# Patient Record
Sex: FEMALE | Race: WHITE | Employment: PART TIME | URBAN - METROPOLITAN AREA
[De-identification: names, ages, dates, MRNs, and addresses within clinical notes are randomized per-mention and may not be internally consistent; named-entity substitution may affect disease eponyms.]

---

## 2020-01-15 ENCOUNTER — OFFICE VISIT (OUTPATIENT)
Dept: INTERNAL MEDICINE CLINIC | Facility: CLINIC | Age: 70
End: 2020-01-15

## 2020-01-15 VITALS
DIASTOLIC BLOOD PRESSURE: 76 MMHG | HEART RATE: 99 BPM | RESPIRATION RATE: 14 BRPM | SYSTOLIC BLOOD PRESSURE: 152 MMHG | HEIGHT: 63 IN | WEIGHT: 149.2 LBS | OXYGEN SATURATION: 97 % | BODY MASS INDEX: 26.44 KG/M2 | TEMPERATURE: 98.2 F

## 2020-01-15 DIAGNOSIS — R73.01 IFG (IMPAIRED FASTING GLUCOSE): ICD-10-CM

## 2020-01-15 DIAGNOSIS — I10 ESSENTIAL HYPERTENSION: Primary | ICD-10-CM

## 2020-01-15 DIAGNOSIS — F41.9 ANXIETY: ICD-10-CM

## 2020-01-15 DIAGNOSIS — Z12.31 ENCOUNTER FOR SCREENING MAMMOGRAM FOR BREAST CANCER: ICD-10-CM

## 2020-01-15 DIAGNOSIS — Z11.59 ENCOUNTER FOR HEPATITIS C SCREENING TEST FOR LOW RISK PATIENT: ICD-10-CM

## 2020-01-15 DIAGNOSIS — E28.39 ESTROGEN DEFICIENCY: ICD-10-CM

## 2020-01-15 DIAGNOSIS — N95.1 MENOPAUSAL SYMPTOMS: ICD-10-CM

## 2020-01-15 RX ORDER — MULTIVITAMIN
TABLET ORAL
COMMUNITY

## 2020-01-15 RX ORDER — LORAZEPAM 0.5 MG/1
0.5 TABLET ORAL
Qty: 20 TAB | Refills: 0 | Status: CANCELLED | OUTPATIENT
Start: 2020-01-15

## 2020-01-15 RX ORDER — PAROXETINE 7.5 MG/1
7.5 CAPSULE ORAL DAILY
Qty: 30 CAP | Refills: 1 | Status: SHIPPED | OUTPATIENT
Start: 2020-01-15 | End: 2020-02-26

## 2020-01-15 RX ORDER — BISMUTH SUBSALICYLATE 262 MG
1 TABLET,CHEWABLE ORAL DAILY
COMMUNITY

## 2020-01-15 RX ORDER — HYDROCHLOROTHIAZIDE 25 MG/1
25 TABLET ORAL DAILY
COMMUNITY
End: 2020-06-29

## 2020-01-15 RX ORDER — BUSPIRONE HYDROCHLORIDE 5 MG/1
TABLET ORAL
COMMUNITY
End: 2020-06-26 | Stop reason: SDUPTHER

## 2020-01-15 RX ORDER — LOSARTAN POTASSIUM 100 MG/1
TABLET ORAL
COMMUNITY
Start: 2019-12-18 | End: 2020-06-29

## 2020-01-15 RX ORDER — LANOLIN ALCOHOL/MO/W.PET/CERES
1000 CREAM (GRAM) TOPICAL DAILY
COMMUNITY

## 2020-01-15 RX ORDER — ESTERIFIED ESTROGEN AND METHYLTESTOSTERONE .625; 1.25 MG/1; MG/1
TABLET ORAL
COMMUNITY
End: 2020-01-15

## 2020-01-15 NOTE — PROGRESS NOTES
HPI  Ms. Sherry Warren is a 71y.o. year old female, she is seen today to establish care. Had hysterectomy in early 45s and was on premarin for years until about 3 years ago stopped premarin and since then hot flashes, feeling irritable, feeling on edge all the time. Feeling anxious frequently. No known stressors, working part time as  and enjoys her job. Gets stressed out quickly as well. Doesn't feel she is getting enough sleep. Normally sleeps only 5-6 hours per night prior to work days, difficulty falling asleep. On days she doesn't have to work may sleep longer. Will feel sad and not know why at times, no crying spells. No SI. Doesn't like getting old. Has been thinking more about getting older. Drinks 2 cups of coffee in the morning, water for the rest of the day. Will attempt to quit smoking in near future. No chest pain, sob, dizziness, weakness, n/v/abd pain. No melena or brbpr. No cough or wheezing. Has occasional HA, takes tylenol and it resolves. Hasn't been checking bp lately. Higher in the office but better at home. No edema, dizziness, lightheadedness. Chief Complaint   Patient presents with   [facility] Establish Care     Room 2B// Previous PCP- Dr Bradley Escobar - last seen this month // fasting // colonoscopy due next year // mammo, dexa due     Menopause     stopped hormone replacement x \"couple years\"    Nicotine Dependence     has rx for Chantix to start next month         Prior to Admission medications    Medication Sig Start Date End Date Taking? Authorizing Provider   busPIRone (BUSPAR) 5 mg tablet Take  by mouth. Yes Provider, Historical   losartan (COZAAR) 100 mg tablet TAKE 1 TABLET BY MOUTH ONCE DAILY FOR 90 DAYS 12/18/19  Yes Provider, Historical   hydroCHLOROthiazide (HYDRODIURIL) 25 mg tablet Take 25 mg by mouth daily. Yes Provider, Historical   multivitamin (ONE A DAY) tablet Take 1 Tab by mouth daily.    Yes Provider, Historical   docosahexanoic acid/epa (FISH OIL PO) Take  by mouth. Yes Provider, Historical   B.infantis-B.ani-B.long-B.bifi (PROBIOTIC 4X) 10-15 mg TbEC Take  by mouth. Yes Provider, Historical   vitamin E acetate (VITAMIN E PO) Take  by mouth. Yes Provider, Historical   cinnamon bark (CINNAMON) 500 mg cap Take  by mouth. Yes Provider, Historical   cyanocobalamin 1,000 mcg tablet Take 1,000 mcg by mouth daily. Yes Provider, Historical   ubidecarenone (CO Q-10 PO) Take  by mouth. Yes Provider, Historical   PARoxetine mesylate,menop.sym, (BRISDELLE) 7.5 mg cap Take 7.5 mg by mouth daily. 1/15/20  Yes Shirlene Sharpe MD   aspirin delayed-release 81 mg tablet Take  by mouth daily. Yes Provider, Historical   estrogens, conjugated,-methylTESTOSTERone (ESTRATEST HS) 0.625-1.25 mg per tablet esterified estrogens-methyltestosterone 0.625 mg-1.25 mg tablet  1/15/20  Provider, Historical   LORazepam (ATIVAN) 0.5 mg tablet Take 1 tablet by mouth every eight (8) hours as needed for Anxiety. 11/23/14   Sim Cam MD   losartan-hydrochlorothiazide [facility]) 100-25 mg per tablet Take 1 tablet by mouth daily. 1/15/20  Brian Grimes MD   omeprazole (PRILOSEC) 20 mg capsule Take 20 mg by mouth daily. 1/15/20  Brian Grimes MD   citalopram (CELEXA) 20 mg tablet Take 20 mg by mouth nightly. 1/15/20  Brian Grimes MD   estrogens, conjugated, (PREMARIN) 0.45 mg tablet Take 0.45 mg by mouth daily. 1/15/20  Brian Grimes MD         Allergies   Allergen Reactions    Duloxetine Nausea Only    Flagyl [Metronidazole] Hives    Levaquin [Levofloxacin] Hives    Penicillins Nausea Only         REVIEW OF SYSTEMS:  Per HPI    PHYSICAL EXAM:  Visit Vitals  /76   Pulse 99   Temp 98.2 °F (36.8 °C) (Oral)   Resp 14   Ht 5' 3\" (1.6 m)   Wt 149 lb 3.2 oz (67.7 kg)   SpO2 97%   BMI 26.43 kg/m²     Constitutional: Appears well-developed and well-nourished. No distress. HENT:   Head: Normocephalic and atraumatic. Eyes: No scleral icterus.  PERRL  Ears: tm's wnl  Mouth: OP clear without lesions, no pharyngeal exudate  Neck: no lad, no tm, supple   Cardiovascular: Normal S1/S2, regular rhythm. No murmurs, rubs, or gallops. Pulmonary/Chest: Effort normal and breath sounds normal. No respiratory distress. No wheezes, rhonchi, or rales. Abdomen: Soft, NT/ND, +BS, no rebound or guarding, no masses, no HSM appreciated. Ext: No edema. Neurological: Alert. Psychiatric: Normal mood and affect. Behavior is normal.     Lab Results   Component Value Date/Time    Sodium 141 06/17/2015 04:06 AM    Potassium 3.7 06/17/2015 04:06 AM    Chloride 110 (H) 06/17/2015 04:06 AM    CO2 23 06/17/2015 04:06 AM    Anion gap 8 06/17/2015 04:06 AM    Glucose 147 (H) 06/17/2015 04:06 AM    BUN 14 06/17/2015 04:06 AM    Creatinine 0.72 06/17/2015 04:06 AM    BUN/Creatinine ratio 19 06/17/2015 04:06 AM    GFR est AA >60 06/17/2015 04:06 AM    GFR est non-AA >60 06/17/2015 04:06 AM    Calcium 8.2 (L) 06/17/2015 04:06 AM    Bilirubin, total 0.4 06/16/2015 02:02 PM    AST (SGOT) 19 06/16/2015 02:02 PM    Alk. phosphatase 84 06/16/2015 02:02 PM    Protein, total 7.6 06/16/2015 02:02 PM    Albumin 3.5 06/16/2015 02:02 PM    Globulin 4.1 (H) 06/16/2015 02:02 PM    A-G Ratio 0.9 (L) 06/16/2015 02:02 PM    ALT (SGPT) 22 06/16/2015 02:02 PM     No results found for: HBA1C, HGBE8, TXA5RXUA, HBP5UPTL   No results found for: CHOL, CHOLPOCT, CHOLX, CHLST, CHOLV, HDL, HDLPOC, HDLP, LDL, LDLCPOC, LDLC, DLDLP, VLDLC, VLDL, TGLX, TRIGL, TRIGP, TGLPOCT, CHHD, CHHDX       ASSESSMENT/PLAN  Diagnoses and all orders for this visit:    1. Essential hypertension  Slightly high - better at home - monitor at home and bring log to follow up  2. Anxiety  -     PARoxetine mesylate,menop.sym, (BRISDELLE) 7.5 mg cap; Take 7.5 mg by mouth daily. Start above, should help anxiety and menopause symptoms  3. IFG (impaired fasting glucose)  Last a1c 5.8 about a month ago - improved from 6.0 prior  4.  Encounter for hepatitis C screening test for low risk patient  Will get old records to see if has been tested  5. Estrogen deficiency  -     DEXA BONE DENSITY STUDY AXIAL; Future    6. Encounter for screening mammogram for breast cancer  -     LAILA MAMMO BI SCREENING INCL CAD; Future    7. Menopausal symptoms  -     PARoxetine mesylate,menop.sym, (BRISDELLE) 7.5 mg cap; Take 7.5 mg by mouth daily. Health Maintenance Due   Topic Date Due    Hepatitis C Screening  1950    DTaP/Tdap/Td series (1 - Tdap) 01/21/1961    COLONOSCOPY  01/21/1968    BREAST CANCER SCRN MAMMOGRAM  01/21/2000    Bone Densitometry (Dexa) Screening  01/21/2015    Pneumococcal 65+ years (1 of 1 - PPSV23) 01/21/2015    MEDICARE YEARLY EXAM  12/09/2019        Follow-up and Dispositions    · Return for 6 weeks med walker.            Reviewed plan of care. Patient has provided input and agrees with goals. The nurse provided the patient and/or family with advanced directive information if needed and encouraged the patient to provide a copy to the office when available.

## 2020-01-15 NOTE — PROGRESS NOTES
Stan Harman  Identified pt with two pt identifiers(name and ). Chief Complaint   Patient presents with   [facility] Establish Care     Room 2B// Previous PCP- Dr Melvin Whalen - last seen this month // fasting     Menopause     stopped hormone replacement x \"couple years\"    Nicotine Dependence     has rx for Chantix to start next month        1. Have you been to the ER, urgent care clinic since your last visit? Hospitalized since your last visit? NO    2. Have you seen or consulted any other health care providers outside of the [address] [place] [address] since your last visit? Include any pap smears or colon screening. NO      Provider notified of reason for visit, vitals and flowsheets obtained on patients. Patient received paperwork for advance directive during previous visit but has not completed at this time     Reviewed record In preparation for visit, huddled with provider and have obtained necessary documentation      Health Maintenance Due   Topic    Hepatitis C Screening     DTaP/Tdap/Td series (1 - Tdap)    BREAST CANCER SCRN MAMMOGRAM     FOBT Q 1 YEAR AGE 50-75     GLAUCOMA SCREENING Q2Y     Bone Densitometry (Dexa) Screening     Pneumococcal 65+ years (1 of 1 - PPSV23)    Influenza Age 5 to Adult     MEDICARE YEARLY EXAM        Wt Readings from Last 3 Encounters:   01/15/20 149 lb 3.2 oz (67.7 kg)   06/16/15 145 lb (65.8 kg)   06/16/15 145 lb (65.8 kg)     Temp Readings from Last 3 Encounters:   06/17/15 98 °F (36.7 °C)   06/16/15 99 °F (37.2 °C)   14 98.7 °F (37.1 °C)     BP Readings from Last 3 Encounters:   09/10/15 137/71   06/17/15 127/69   06/16/15 129/72     Pulse Readings from Last 3 Encounters:   09/10/15 76   06/17/15 93   06/16/15 94     Vitals:    01/15/20 1140   Resp: 14   Weight: 149 lb 3.2 oz (67.7 kg)   Height: 5' 3\" (1.6 m)   PainSc:   0 - No pain         Learning Assessment:  :     No flowsheet data found.     Depression Screening:  :     3 most recent PHQ Screens 1/15/2020   Little interest or pleasure in doing things Not at all   Feeling down, depressed, irritable, or hopeless Not at all   Total Score PHQ 2 0       Fall Risk Assessment:  :     Fall Risk Assessment, last 12 mths 1/15/2020   Able to walk? Yes   Fall in past 12 months? No       Abuse Screening:  :     Abuse Screening Questionnaire 1/15/2020   Do you ever feel afraid of your partner? N   Are you in a relationship with someone who physically or mentally threatens you? N   Is it safe for you to go home? Y       ADL Screening:  :     ADL Assessment 1/15/2020   Feeding yourself No Help Needed   Getting from bed to chair No Help Needed   Getting dressed No Help Needed   Bathing or showering No Help Needed   Walk across the room (includes cane/walker) No Help Needed   Using the telphone No Help Needed   Taking your medications No Help Needed   Preparing meals No Help Needed   Managing money (expenses/bills) No Help Needed   Moderately strenuous housework (laundry) No Help Needed   Shopping for personal items (toiletries/medicines) No Help Needed   Shopping for groceries No Help Needed   Driving No Help Needed   Climbing a flight of stairs No Help Needed   Getting to places beyond walking distances No Help Needed         Medication reconciliation up to date and corrected with patient at this time.

## 2020-01-28 ENCOUNTER — OFFICE VISIT (OUTPATIENT)
Dept: INTERNAL MEDICINE CLINIC | Facility: CLINIC | Age: 70
End: 2020-01-28

## 2020-01-28 VITALS
HEART RATE: 107 BPM | BODY MASS INDEX: 25.73 KG/M2 | WEIGHT: 145.2 LBS | RESPIRATION RATE: 14 BRPM | DIASTOLIC BLOOD PRESSURE: 65 MMHG | TEMPERATURE: 98.1 F | OXYGEN SATURATION: 96 % | SYSTOLIC BLOOD PRESSURE: 105 MMHG | HEIGHT: 63 IN

## 2020-01-28 DIAGNOSIS — K52.9 GASTROENTERITIS: Primary | ICD-10-CM

## 2020-01-28 RX ORDER — ONDANSETRON 8 MG/1
8 TABLET, ORALLY DISINTEGRATING ORAL
Qty: 20 TAB | Refills: 0 | Status: SHIPPED | OUTPATIENT
Start: 2020-01-28

## 2020-01-28 NOTE — PROGRESS NOTES
HPI  Ms. Bolivar Burgess is a 79y.o. year old female, she is seen today for n/v/diarrhea which started this morning. Has vomited twice and multiple spells of diarrhea. No abdominal pain, no vomiting in 3 hours. Able to tolerate water. No dysuria, frequency or urgency. No f/c, +feeling achy. No sore throat, head congestion or cough. No known sick contacts. Chief Complaint   Patient presents with    Diarrhea     Room 2A// began this am // denies fever // vomited x 2    Nausea        Prior to Admission medications    Medication Sig Start Date End Date Taking? Authorizing Provider   ondansetron (ZOFRAN ODT) 8 mg disintegrating tablet Take 1 Tab by mouth every eight (8) hours as needed for Nausea, Vomiting or Nausea or Vomiting. 1/28/20  Yes Riana Tucker MD   busPIRone (BUSPAR) 5 mg tablet Take  by mouth. Yes Provider, Historical   losartan (COZAAR) 100 mg tablet TAKE 1 TABLET BY MOUTH ONCE DAILY FOR 90 DAYS 12/18/19  Yes Provider, Historical   hydroCHLOROthiazide (HYDRODIURIL) 25 mg tablet Take 25 mg by mouth daily. Yes Provider, Historical   multivitamin (ONE A DAY) tablet Take 1 Tab by mouth daily. Yes Provider, Historical   docosahexanoic acid/epa (FISH OIL PO) Take  by mouth. Yes Provider, Historical   B.infantis-B.ani-B.long-B.bifi (PROBIOTIC 4X) 10-15 mg TbEC Take  by mouth. Yes Provider, Historical   vitamin E acetate (VITAMIN E PO) Take  by mouth. Yes Provider, Historical   cinnamon bark (CINNAMON) 500 mg cap Take  by mouth. Yes Provider, Historical   cyanocobalamin 1,000 mcg tablet Take 1,000 mcg by mouth daily. Yes Provider, Historical   ubidecarenone (CO Q-10 PO) Take  by mouth. Yes Provider, Historical   PARoxetine mesylate,menop.sym, (BRISDELLE) 7.5 mg cap Take 7.5 mg by mouth daily. 1/15/20  Yes Riana Tucker MD   aspirin delayed-release 81 mg tablet Take  by mouth daily.    Yes Provider, Historical   LORazepam (ATIVAN) 0.5 mg tablet Take 1 tablet by mouth every eight (8) hours as needed for Anxiety. 11/23/14  Yes Hackett MD Faisal         Allergies   Allergen Reactions    Duloxetine Nausea Only    Flagyl [Metronidazole] Hives    Levaquin [Levofloxacin] Hives    Penicillins Nausea Only         REVIEW OF SYSTEMS:  Per HPI    PHYSICAL EXAM:  Visit Vitals  /65 (BP 1 Location: Left arm, BP Patient Position: Sitting)   Pulse (!) 107   Temp 98.1 °F (36.7 °C) (Oral)   Resp 14   Ht 5' 3\" (1.6 m)   Wt 145 lb 3.2 oz (65.9 kg)   SpO2 96%   BMI 25.72 kg/m²     Constitutional: Appears well-developed and well-nourished. No distress. HENT:   Head: Normocephalic and atraumatic. Eyes: No scleral icterus. Mouth: OP clear without lesions, no pharyngeal exudate, mmm  Neck: no lad, no tm, supple   Cardiovascular: Normal S1/S2, regular rhythm. No murmurs, rubs, or gallops. Pulmonary/Chest: Effort normal and breath sounds normal. No respiratory distress. No wheezes, rhonchi, or rales. Abdomen: Soft, NT/ND, +BS, no rebound or guarding, no masses, no HSM appreciated. Back: no CVA tenderness  Ext: No edema. Neurological: Alert. Psychiatric: Normal mood and affect. Behavior is normal.     Lab Results   Component Value Date/Time    Sodium 141 06/17/2015 04:06 AM    Potassium 3.7 06/17/2015 04:06 AM    Chloride 110 (H) 06/17/2015 04:06 AM    CO2 23 06/17/2015 04:06 AM    Anion gap 8 06/17/2015 04:06 AM    Glucose 147 (H) 06/17/2015 04:06 AM    BUN 14 06/17/2015 04:06 AM    Creatinine 0.72 06/17/2015 04:06 AM    BUN/Creatinine ratio 19 06/17/2015 04:06 AM    GFR est AA >60 06/17/2015 04:06 AM    GFR est non-AA >60 06/17/2015 04:06 AM    Calcium 8.2 (L) 06/17/2015 04:06 AM    Bilirubin, total 0.4 06/16/2015 02:02 PM    AST (SGOT) 19 06/16/2015 02:02 PM    Alk.  phosphatase 84 06/16/2015 02:02 PM    Protein, total 7.6 06/16/2015 02:02 PM    Albumin 3.5 06/16/2015 02:02 PM    Globulin 4.1 (H) 06/16/2015 02:02 PM    A-G Ratio 0.9 (L) 06/16/2015 02:02 PM    ALT (SGPT) 22 06/16/2015 02:02 PM No results found for: HBA1C, HGBE8, FFJ4ZMMJ, NRS9LAFB   No results found for: CHOL, CHOLPOCT, CHOLX, CHLST, CHOLV, HDL, HDLPOC, HDLP, LDL, LDLCPOC, LDLC, DLDLP, VLDLC, VLDL, TGLX, TRIGL, TRIGP, TGLPOCT, CHHD, CHHDX       ASSESSMENT/PLAN  Diagnoses and all orders for this visit:    1. Gastroenteritis  -     ondansetron (ZOFRAN ODT) 8 mg disintegrating tablet; Take 1 Tab by mouth every eight (8) hours as needed for Nausea, Vomiting or Nausea or Vomiting. Likely viral - treat supportively, if severe abdominal pain or inability to tolerate po to ED    Health Maintenance Due   Topic Date Due    Hepatitis C Screening  1950    DTaP/Tdap/Td series (1 - Tdap) 01/21/1961    COLONOSCOPY  01/21/1968    BREAST CANCER SCRN MAMMOGRAM  01/21/2000    Bone Densitometry (Dexa) Screening  01/21/2015    Pneumococcal 65+ years (1 of 1 - PPSV23) 01/21/2015    MEDICARE YEARLY EXAM  12/09/2019        Follow-up and Dispositions    · Return if symptoms worsen or fail to improve. Reviewed plan of care. Patient has provided input and agrees with goals. The nurse provided the patient and/or family with advanced directive information if needed and encouraged the patient to provide a copy to the office when available.

## 2020-01-28 NOTE — PATIENT INSTRUCTIONS
Gastroenteritis: Care Instructions Your Care Instructions Gastroenteritis is an illness that may cause nausea, vomiting, and diarrhea. It is sometimes called \"stomach flu. \" It can be caused by bacteria or a virus. You will probably begin to feel better in 1 to 2 days. In the meantime, get plenty of rest and make sure you do not become dehydrated. Dehydration occurs when your body loses too much fluid. Follow-up care is a key part of your treatment and safety. Be sure to make and go to all appointments, and call your doctor if you are having problems. It's also a good idea to know your test results and keep a list of the medicines you take. How can you care for yourself at home? · If your doctor prescribed antibiotics, take them as directed. Do not stop taking them just because you feel better. You need to take the full course of antibiotics. · Drink plenty of fluids to prevent dehydration, enough so that your urine is light yellow or clear like water. Choose water and other caffeine-free clear liquids until you feel better. If you have kidney, heart, or liver disease and have to limit fluids, talk with your doctor before you increase your fluid intake. · Drink fluids slowly, in frequent, small amounts, because drinking too much too fast can cause vomiting. · Begin eating mild foods, such as dry toast, yogurt, applesauce, bananas, and rice. Avoid spicy, hot, or high-fat foods, and do not drink alcohol or caffeine for a day or two. Do not drink milk or eat ice cream until you are feeling better. How to prevent gastroenteritis · Keep hot foods hot and cold foods cold. · Do not eat meats, dressings, salads, or other foods that have been kept at room temperature for more than 2 hours. · Use a thermometer to check your refrigerator. It should be between 34°F and 40°F. 
· Defrost meats in the refrigerator or microwave, not on the kitchen counter. · Keep your hands and your kitchen clean. Wash your hands, cutting boards, and countertops with hot soapy water frequently. · Cook meat until it is well done. · Do not eat raw eggs or uncooked sauces made with raw eggs. · Do not take chances. If food looks or tastes spoiled, throw it out. When should you call for help? Call 911 anytime you think you may need emergency care. For example, call if: 
  · You vomit blood or what looks like coffee grounds.  
  · You passed out (lost consciousness).  
  · You pass maroon or very bloody stools.  
 Call your doctor now or seek immediate medical care if: 
  · You have severe belly pain.  
  · You have signs of needing more fluids. You have sunken eyes, a dry mouth, and pass only a little dark urine.  
  · You feel like you are going to faint.  
  · You have increased belly pain that does not go away in 1 to 2 days.  
  · You have new or increased nausea, or you are vomiting.  
  · You have a new or higher fever.  
  · Your stools are black and tarlike or have streaks of blood.  
 Watch closely for changes in your health, and be sure to contact your doctor if: 
  · You are dizzy or lightheaded.  
  · You urinate less than usual, or your urine is dark yellow or brown.  
  · You do not feel better with each day that goes by. Where can you learn more? Go to http://jennifer-linh.info/. Enter N142 in the search box to learn more about \"Gastroenteritis: Care Instructions. \" Current as of: June 9, 2019 Content Version: 12.2 © 5092-7891 [employer]. Care instructions adapted under license by [employer] (which disclaims liability or warranty for this information). If you have questions about a medical condition or this instruction, always ask your healthcare professional. [address] any warranty or liability for your use of this information.

## 2020-01-28 NOTE — PROGRESS NOTES
Debbie Gonzalez  Identified pt with two pt identifiers(name and ). Chief Complaint   Patient presents with    Diarrhea     Room 2A// began this am // denies fever // vomited x 2    Nausea       1. Have you been to the ER, urgent care clinic since your last visit? Hospitalized since your last visit? NO    2. Have you seen or consulted any other health care providers outside of the [address] [place] [address] since your last visit? Include any pap smears or colon screening. NO      Provider notified of reason for visit, vitals and flowsheets obtained on patients. Patient received paperwork for advance directive during previous visit but has not completed at this time     Reviewed record In preparation for visit, huddled with provider and have obtained necessary documentation      Health Maintenance Due   Topic    Hepatitis C Screening     DTaP/Tdap/Td series (1 - Tdap)    COLONOSCOPY     BREAST CANCER SCRN MAMMOGRAM     Bone Densitometry (Dexa) Screening     Pneumococcal 65+ years (1 of 1 - PPSV23)    MEDICARE YEARLY EXAM        Wt Readings from Last 3 Encounters:   20 145 lb 3.2 oz (65.9 kg)   01/15/20 149 lb 3.2 oz (67.7 kg)   06/16/15 145 lb (65.8 kg)     Temp Readings from Last 3 Encounters:   20 98.1 °F (36.7 °C) (Oral)   01/15/20 98.2 °F (36.8 °C) (Oral)   06/17/15 98 °F (36.7 °C)     BP Readings from Last 3 Encounters:   20 105/65   01/15/20 152/76   09/10/15 137/71     Pulse Readings from Last 3 Encounters:   20 (!) 107   01/15/20 99   09/10/15 76     Vitals:    20 1616   BP: 105/65   Pulse: (!) 107   Resp: 14   Temp: 98.1 °F (36.7 °C)   TempSrc: Oral   SpO2: 96%   Weight: 145 lb 3.2 oz (65.9 kg)   Height: 5' 3\" (1.6 m)   PainSc:   0 - No pain         Learning Assessment:  :     No flowsheet data found.     Depression Screening:  :     3 most recent PHQ Screens 1/15/2020   Little interest or pleasure in doing things Not at all   Feeling down, depressed, irritable, or hopeless Not at all   Total Score PHQ 2 0       Fall Risk Assessment:  :     Fall Risk Assessment, last 12 mths 1/15/2020   Able to walk? Yes   Fall in past 12 months? No       Abuse Screening:  :     Abuse Screening Questionnaire 1/15/2020   Do you ever feel afraid of your partner? N   Are you in a relationship with someone who physically or mentally threatens you? N   Is it safe for you to go home? Y       ADL Screening:  :     ADL Assessment 1/15/2020   Feeding yourself No Help Needed   Getting from bed to chair No Help Needed   Getting dressed No Help Needed   Bathing or showering No Help Needed   Walk across the room (includes cane/walker) No Help Needed   Using the telphone No Help Needed   Taking your medications No Help Needed   Preparing meals No Help Needed   Managing money (expenses/bills) No Help Needed   Moderately strenuous housework (laundry) No Help Needed   Shopping for personal items (toiletries/medicines) No Help Needed   Shopping for groceries No Help Needed   Driving No Help Needed   Climbing a flight of stairs No Help Needed   Getting to places beyond walking distances No Help Needed         Medication reconciliation up to date and corrected with patient at this time.

## 2020-01-30 ENCOUNTER — TELEPHONE (OUTPATIENT)
Dept: INTERNAL MEDICINE CLINIC | Facility: CLINIC | Age: 70
End: 2020-01-30

## 2020-01-30 NOTE — TELEPHONE ENCOUNTER
Outgoing call to pt who states she is feeling better from 1/28 appt w/ Dr Lofton Agent r/t GI viral illness. No longer having diarrhea. Able to eat and drink. Denies abdominal pain.  reports pt had night time sweating and leg cramping last night which is why he is concerned, bu that his wife is feeling better now. Advise her to rest today and eat and drink as tolerated. If she has another bad night then certainly we can see her tomorrow in an acute slot. Pt in agreement.

## 2020-01-30 NOTE — TELEPHONE ENCOUNTER
Pt's , Merline Trimble, called and requested a call back from the nurse or provider to discuss his wife's condition. He stated that pt was seen recently but has shown no improvement and her condition has worsened. Pt requested a callback at [phone].

## 2020-02-06 ENCOUNTER — TELEPHONE (OUTPATIENT)
Dept: INTERNAL MEDICINE CLINIC | Facility: CLINIC | Age: 70
End: 2020-02-06

## 2020-02-06 NOTE — TELEPHONE ENCOUNTER
Continue miralax daily, stay hydrated, may add milk of magnesium, senna or bisacodyl temporarily - avoid zofran as can make constipation worse

## 2020-02-06 NOTE — TELEPHONE ENCOUNTER
Pt reports the last 4 days, has been unable to go to the restroom, having cramps in her side. Used Miralax last night and this am. Pt reports small amopunt of clear Mucous from rectum today.

## 2020-02-06 NOTE — TELEPHONE ENCOUNTER
Pt called because she is having \"some problems\" and would like to discuss them with the nurse. Pt declined to provide any additional info. Please return call at [phone].

## 2020-02-07 ENCOUNTER — TELEPHONE (OUTPATIENT)
Dept: INTERNAL MEDICINE CLINIC | Facility: CLINIC | Age: 70
End: 2020-02-07

## 2020-02-07 NOTE — TELEPHONE ENCOUNTER
Pt is returning a call from the nurse and is requesting a call back regarding \"issues\" that were discussed by phone yesterday. Pt requested a call back at [phone].

## 2020-02-26 ENCOUNTER — OFFICE VISIT (OUTPATIENT)
Dept: INTERNAL MEDICINE CLINIC | Facility: CLINIC | Age: 70
End: 2020-02-26

## 2020-02-26 VITALS
WEIGHT: 145.8 LBS | HEIGHT: 63 IN | SYSTOLIC BLOOD PRESSURE: 124 MMHG | TEMPERATURE: 98.1 F | RESPIRATION RATE: 14 BRPM | DIASTOLIC BLOOD PRESSURE: 72 MMHG | BODY MASS INDEX: 25.83 KG/M2 | OXYGEN SATURATION: 100 % | HEART RATE: 100 BPM

## 2020-02-26 DIAGNOSIS — F41.9 ANXIETY: ICD-10-CM

## 2020-02-26 DIAGNOSIS — N95.1 MENOPAUSAL SYNDROME (HOT FLASHES): ICD-10-CM

## 2020-02-26 DIAGNOSIS — Z00.00 MEDICARE ANNUAL WELLNESS VISIT, SUBSEQUENT: Primary | ICD-10-CM

## 2020-02-26 RX ORDER — PAROXETINE 10 MG/1
5 TABLET, FILM COATED ORAL
Qty: 30 TAB | Refills: 1 | Status: SHIPPED | OUTPATIENT
Start: 2020-02-26 | End: 2020-04-13

## 2020-02-26 NOTE — PATIENT INSTRUCTIONS
Medicare Wellness Visit, Female The best way to live healthy is to have a lifestyle where you eat a well-balanced diet, exercise regularly, limit alcohol use, and quit all forms of tobacco/nicotine, if applicable. Regular preventive services are another way to keep healthy. Preventive services (vaccines, screening tests, monitoring & exams) can help personalize your care plan, which helps you manage your own care. Screening tests can find health problems at the earliest stages, when they are easiest to treat. [facility] follows the current, evidence-based guidelines published by the [facility] [place] Aleksandar Chandler ([facility]) when recommending preventive services for our patients. Because we follow these guidelines, sometimes recommendations change over time as research supports it. (For example, mammograms used to be recommended annually. Even though Medicare will still pay for an annual mammogram, the newer guidelines recommend a mammogram every two years for women of average risk). Of course, you and your doctor may decide to screen more often for some diseases, based on your risk and your co-morbidities (chronic disease you are already diagnosed with). Preventive services for you include: - Medicare offers their members a free annual wellness visit, which is time for you and your primary care provider to discuss and plan for your preventive service needs. Take advantage of this benefit every year! 
-All adults over the age of 72 should receive the recommended pneumonia vaccines. Current USPSTF guidelines recommend a series of two vaccines for the best pneumonia protection.  
-All adults should have a flu vaccine yearly and a tetanus vaccine every 10 years.  
-All adults age 48 and older should receive the shingles vaccines (series of two vaccines). -All adults age 38-68 who are overweight should have a diabetes screening test once every three years. -All adults born between [birth year] and 1965 should be screened once for Hepatitis C. 
-Other screening tests and preventive services for persons with diabetes include: an eye exam to screen for diabetic retinopathy, a kidney function test, a foot exam, and stricter control over your cholesterol.  
-Cardiovascular screening for adults with routine risk involves an electrocardiogram (ECG) at intervals determined by your doctor.  
-Colorectal cancer screenings should be done for adults age 54-65 with no increased risk factors for colorectal cancer. There are a number of acceptable methods of screening for this type of cancer. Each test has its own benefits and drawbacks. Discuss with your doctor what is most appropriate for you during your annual wellness visit. The different tests include: colonoscopy (considered the best screening method), a fecal occult blood test, a fecal DNA test, and sigmoidoscopy. 
 
-A bone mass density test is recommended when a woman turns 65 to screen for osteoporosis. This test is only recommended one time, as a screening. Some providers will use this same test as a disease monitoring tool if you already have osteoporosis. -Breast cancer screenings are recommended every other year for women of normal risk, age 54-69. 
-Cervical cancer screenings for women over age 72 are only recommended with certain risk factors. Here is a list of your current Health Maintenance items (your personalized list of preventive services) with a due date: 
Health Maintenance Due Topic Date Due  
 Hepatitis C Test  1950  DTaP/Tdap/Td  (1 - Tdap) 01/21/1961  Colonoscopy  01/21/1968  Cholesterol Test   01/21/1990  Mammogram  01/21/2000  Bone Mineral Density   01/21/2015  Pneumococcal Vaccine (1 of 1 - PPSV23) 01/21/2015 [address] [place] [address] Annual Well Visit  12/09/2019

## 2020-02-26 NOTE — PROGRESS NOTES
Mireya Johnston  Identified pt with two pt identifiers(name and ). Chief Complaint   Patient presents with    Medication Evaluation     Room . Have you been to the ER, urgent care clinic since your last visit? Hospitalized since your last visit? NO    2. Have you seen or consulted any other health care providers outside of the [address] [place] [address] since your last visit? Include any pap smears or colon screening. NO      Provider notified of reason for visit, vitals and flowsheets obtained on patients. Patient received paperwork for advance directive during previous visit but has not completed at this time     Reviewed record In preparation for visit, huddled with provider and have obtained necessary documentation      Health Maintenance Due   Topic    Hepatitis C Screening     DTaP/Tdap/Td series (1 - Tdap)    Colonoscopy     Lipid Screen     Breast Cancer Screen Mammogram     Bone Densitometry (Dexa) Screening     Pneumococcal 65+ years (1 of  - PPSV23)    Medicare Yearly Exam        Wt Readings from Last 3 Encounters:   20 145 lb 3.2 oz (65.9 kg)   01/15/20 149 lb 3.2 oz (67.7 kg)   06/16/15 145 lb (65.8 kg)     Temp Readings from Last 3 Encounters:   20 98.1 °F (36.7 °C) (Oral)   01/15/20 98.2 °F (36.8 °C) (Oral)   06/17/15 98 °F (36.7 °C)     BP Readings from Last 3 Encounters:   20 105/65   01/15/20 152/76   09/10/15 137/71     Pulse Readings from Last 3 Encounters:   20 (!) 107   01/15/20 99   09/10/15 76     There were no vitals filed for this visit. Learning Assessment:  :     No flowsheet data found. Depression Screening:  :     3 most recent PHQ Screens 2020   Little interest or pleasure in doing things Not at all   Feeling down, depressed, irritable, or hopeless Not at all   Total Score PHQ 2 0       Fall Risk Assessment:  :     Fall Risk Assessment, last 12 mths 1/15/2020   Able to walk? Yes   Fall in past 12 months?  No       Abuse Screening:  :     Abuse Screening Questionnaire 1/15/2020   Do you ever feel afraid of your partner? N   Are you in a relationship with someone who physically or mentally threatens you? N   Is it safe for you to go home? Y       ADL Screening:  :     ADL Assessment 1/15/2020   Feeding yourself No Help Needed   Getting from bed to chair No Help Needed   Getting dressed No Help Needed   Bathing or showering No Help Needed   Walk across the room (includes cane/walker) No Help Needed   Using the telphone No Help Needed   Taking your medications No Help Needed   Preparing meals No Help Needed   Managing money (expenses/bills) No Help Needed   Moderately strenuous housework (laundry) No Help Needed   Shopping for personal items (toiletries/medicines) No Help Needed   Shopping for groceries No Help Needed   Driving No Help Needed   Climbing a flight of stairs No Help Needed   Getting to places beyond walking distances No Help Needed         Medication reconciliation up to date and corrected with patient at this time.

## 2020-02-26 NOTE — PROGRESS NOTES
HPI  Ms. Tere Restrepo is a 79y.o. year old female, she is seen today for follow up menopausal symptoms and anxiety after staring RAYSA cochran. Has been getting colonoscopy every 5 years with Dr. Laurie Fisher. No n/v/abd pain, diarrhea or constipation, no melena or brpbr. Says she is feeling more slow, low mood, sleepy. Hot flashes improved - less frequent. Feeling a little more sad. Not feeling anxious anymore. Less interested in things she used to enjoy. Feels libido lower since off HRT, no dyspareunia. Hasn't been taking buspar as hasn't needed. Says last few times she has had extra stress such as company coming has had n/v/diarrhea. Chief Complaint   Patient presents with    Medication Evaluation     Room 2B// Alexandro Cross and shashi\"    Annual Wellness Visit        Prior to Admission medications    Medication Sig Start Date End Date Taking? Authorizing Provider   PARoxetine (PAXIL) 10 mg tablet Take 0.5 Tabs by mouth nightly. 2/26/20  Yes Maria Victoria Dacosta MD   busPIRone (BUSPAR) 5 mg tablet Take  by mouth. Yes Provider, Historical   losartan (COZAAR) 100 mg tablet TAKE 1 TABLET BY MOUTH ONCE DAILY FOR 90 DAYS 12/18/19  Yes Provider, Historical   hydroCHLOROthiazide (HYDRODIURIL) 25 mg tablet Take 25 mg by mouth daily. Yes Provider, Historical   multivitamin (ONE A DAY) tablet Take 1 Tab by mouth daily. Yes Provider, Historical   docosahexanoic acid/epa (FISH OIL PO) Take  by mouth. Yes Provider, Historical   B.infantis-B.ani-B.long-B.bifi (PROBIOTIC 4X) 10-15 mg TbEC Take  by mouth. Yes Provider, Historical   vitamin E acetate (VITAMIN E PO) Take  by mouth. Yes Provider, Historical   cinnamon bark (CINNAMON) 500 mg cap Take  by mouth. Yes Provider, Historical   cyanocobalamin 1,000 mcg tablet Take 1,000 mcg by mouth daily. Yes Provider, Historical   ubidecarenone (CO Q-10 PO) Take  by mouth.    Yes Provider, Historical   aspirin delayed-release 81 mg tablet Take  by mouth daily. Yes Provider, Historical   ondansetron (ZOFRAN ODT) 8 mg disintegrating tablet Take 1 Tab by mouth every eight (8) hours as needed for Nausea, Vomiting or Nausea or Vomiting. 1/28/20   Tanja Carrillo MD   PARoxetine mesylate,menop.sym, (BRISDELLE) 7.5 mg cap Take 7.5 mg by mouth daily. 1/15/20 2/26/20  Tanja Carrillo MD   LORazepam (ATIVAN) 0.5 mg tablet Take 1 tablet by mouth every eight (8) hours as needed for Anxiety. 11/23/14   Viviane Sherman MD         Allergies   Allergen Reactions    Duloxetine Nausea Only    Flagyl [Metronidazole] Hives    Levaquin [Levofloxacin] Hives    Penicillins Nausea Only         REVIEW OF SYSTEMS:  Per HPI    PHYSICAL EXAM:  Visit Vitals  /72   Pulse 100   Temp 98.1 °F (36.7 °C) (Oral)   Resp 14   Ht 5' 3\" (1.6 m)   Wt 145 lb 12.8 oz (66.1 kg)   SpO2 100%   BMI 25.83 kg/m²     Constitutional: Appears well-developed and well-nourished. No distress. HENT:   Head: Normocephalic and atraumatic. Eyes: No scleral icterus. Cardiovascular: Normal S1/S2, regular rhythm. No murmurs, rubs, or gallops. Pulmonary/Chest: Effort normal and breath sounds normal. No respiratory distress. No wheezes, rhonchi, or rales. Ext: No edema. Neurological: Alert. Psychiatric: Normal mood and affect. Behavior is normal. Good eye contact. Lab Results   Component Value Date/Time    Sodium 141 06/17/2015 04:06 AM    Potassium 3.7 06/17/2015 04:06 AM    Chloride 110 (H) 06/17/2015 04:06 AM    CO2 23 06/17/2015 04:06 AM    Anion gap 8 06/17/2015 04:06 AM    Glucose 147 (H) 06/17/2015 04:06 AM    BUN 14 06/17/2015 04:06 AM    Creatinine 0.72 06/17/2015 04:06 AM    BUN/Creatinine ratio 19 06/17/2015 04:06 AM    GFR est AA >60 06/17/2015 04:06 AM    GFR est non-AA >60 06/17/2015 04:06 AM    Calcium 8.2 (L) 06/17/2015 04:06 AM    Bilirubin, total 0.4 06/16/2015 02:02 PM    AST (SGOT) 19 06/16/2015 02:02 PM    Alk.  phosphatase 84 06/16/2015 02:02 PM    Protein, total 7.6 06/16/2015 02:02 PM    Albumin 3.5 06/16/2015 02:02 PM    Globulin 4.1 (H) 06/16/2015 02:02 PM    A-G Ratio 0.9 (L) 06/16/2015 02:02 PM    ALT (SGPT) 22 06/16/2015 02:02 PM     No results found for: HBA1C, HGBE8, KYP0LOYL, LDQ2LNWH   No results found for: CHOL, CHOLPOCT, CHOLX, CHLST, CHOLV, HDL, HDLPOC, HDLP, LDL, LDLCPOC, LDLC, DLDLP, VLDLC, VLDL, TGLX, TRIGL, TRIGP, TGLPOCT, CHHD, CHHDX       ASSESSMENT/PLAN  Diagnoses and all orders for this visit:    1. Medicare annual wellness visit, subsequent    2. Menopausal syndrome (hot flashes)  -     PARoxetine (PAXIL) 10 mg tablet; Take 0.5 Tabs by mouth nightly. 3. Anxiety  -     PARoxetine (PAXIL) 10 mg tablet; Take 0.5 Tabs by mouth nightly. Anxiety and hot flashes improved but now feeling more down, fatigued  Will decrease dose of paxil  Suspect underlying mood is contributing to low libido    Will attempt to get colonoscopy report    Health Maintenance Due   Topic Date Due    Hepatitis C Screening  1950    DTaP/Tdap/Td series (1 - Tdap) 01/21/1961    Colonoscopy  01/21/1968    Lipid Screen  01/21/1990    Breast Cancer Screen Mammogram  01/21/2000    Bone Densitometry (Dexa) Screening  01/21/2015    Pneumococcal 65+ years (1 of 1 - PPSV23) 01/21/2015    Medicare Yearly Exam  12/09/2019        Follow-up and Dispositions    · Return in about 1 month (around 3/26/2020) for anxiety. Reviewed plan of care. Patient has provided input and agrees with goals. The nurse provided the patient and/or family with advanced directive information if needed and encouraged the patient to provide a copy to the office when available. This is the Subsequent Medicare Annual Wellness Exam, performed 12 months or more after the Initial AWV or the last Subsequent AWV    I have reviewed the patient's medical history in detail and updated the computerized patient record.      History     Patient Active Problem List   Diagnosis Code    Colitis K52.9 Past Medical History:   Diagnosis Date    Anxiety     GERD (gastroesophageal reflux disease)     HTN (hypertension)       Past Surgical History:   Procedure Laterality Date    HX BUNIONECTOMY Left     HX HYSTERECTOMY      HX LITHOTRIPSY      HX OTHER SURGICAL      colonoscopy - per pt around 2010     Current Outpatient Medications   Medication Sig Dispense Refill    PARoxetine (PAXIL) 10 mg tablet Take 0.5 Tabs by mouth nightly. 30 Tab 1    busPIRone (BUSPAR) 5 mg tablet Take  by mouth.  losartan (COZAAR) 100 mg tablet TAKE 1 TABLET BY MOUTH ONCE DAILY FOR 90 DAYS      hydroCHLOROthiazide (HYDRODIURIL) 25 mg tablet Take 25 mg by mouth daily.  multivitamin (ONE A DAY) tablet Take 1 Tab by mouth daily.  docosahexanoic acid/epa (FISH OIL PO) Take  by mouth.  B.infantis-B.ani-B.long-B.bifi (PROBIOTIC 4X) 10-15 mg TbEC Take  by mouth.  vitamin E acetate (VITAMIN E PO) Take  by mouth.  cinnamon bark (CINNAMON) 500 mg cap Take  by mouth.  cyanocobalamin 1,000 mcg tablet Take 1,000 mcg by mouth daily.  ubidecarenone (CO Q-10 PO) Take  by mouth.  aspirin delayed-release 81 mg tablet Take  by mouth daily.  ondansetron (ZOFRAN ODT) 8 mg disintegrating tablet Take 1 Tab by mouth every eight (8) hours as needed for Nausea, Vomiting or Nausea or Vomiting. 20 Tab 0    LORazepam (ATIVAN) 0.5 mg tablet Take 1 tablet by mouth every eight (8) hours as needed for Anxiety.  20 tablet 0     Allergies   Allergen Reactions    Duloxetine Nausea Only    Flagyl [Metronidazole] Hives    Levaquin [Levofloxacin] Hives    Penicillins Nausea Only       Family History   Problem Relation Age of Onset    Cancer Mother         brain    Cancer Maternal Grandmother         uterine     Social History     Tobacco Use    Smoking status: Current Every Day Smoker     Years: 30.00    Smokeless tobacco: Never Used    Tobacco comment: 5- 6 cigs daily    Substance Use Topics    Alcohol use: Yes     Comment: 1 glass vodka monthly        Depression Risk Factor Screening:     3 most recent PHQ Screens 1/28/2020   Little interest or pleasure in doing things Not at all   Feeling down, depressed, irritable, or hopeless Not at all   Total Score PHQ 2 0       Alcohol Risk Factor Screening:   Do you average 1 drink per night or more than 7 drinks a week:  No    On any one occasion in the past three months have you have had more than 3 drinks containing alcohol:  No      Functional Ability and Level of Safety:   Hearing: Hearing is good. Activities of Daily Living: The home contains: handrails  Patient does total self care    Ambulation: with no difficulty    Fall Risk:  Fall Risk Assessment, last 12 mths 1/15/2020   Able to walk? Yes   Fall in past 12 months? No       Abuse Screen:  Patient is not abused    Cognitive Screening   Has your family/caregiver stated any concerns about your memory: yes - more forgetful  Cognitive Screening: recall 2/3    Patient Care Team   Patient Care Team:  Julien Nguyen MD as PCP - General (Internal Medicine)  Julien Nguyen MD as PCP - [facility] Empaneled Provider    Assessment/Plan   Education and counseling provided:  Are appropriate based on today's review and evaluation    Diagnoses and all orders for this visit:    1. Medicare annual wellness visit, subsequent    2. Menopausal syndrome (hot flashes)  -     PARoxetine (PAXIL) 10 mg tablet; Take 0.5 Tabs by mouth nightly. 3. Anxiety  -     PARoxetine (PAXIL) 10 mg tablet; Take 0.5 Tabs by mouth nightly.         Health Maintenance Due   Topic Date Due    Hepatitis C Screening  1950    DTaP/Tdap/Td series (1 - Tdap) 01/21/1961    Colonoscopy  01/21/1968    Lipid Screen  01/21/1990    Breast Cancer Screen Mammogram  01/21/2000    Bone Densitometry (Dexa) Screening  01/21/2015    Pneumococcal 65+ years (1 of 1 - PPSV23) 01/21/2015    Medicare Yearly Exam  12/09/2019

## 2020-04-13 ENCOUNTER — VIRTUAL VISIT (OUTPATIENT)
Dept: INTERNAL MEDICINE CLINIC | Facility: CLINIC | Age: 70
End: 2020-04-13

## 2020-04-13 DIAGNOSIS — F41.9 ANXIETY: ICD-10-CM

## 2020-04-13 DIAGNOSIS — N95.1 MENOPAUSAL SYNDROME (HOT FLASHES): Primary | ICD-10-CM

## 2020-04-13 RX ORDER — ACETAMINOPHEN 500 MG
TABLET ORAL 2 TIMES DAILY
COMMUNITY

## 2020-04-13 RX ORDER — GABAPENTIN 100 MG/1
CAPSULE ORAL
Qty: 90 CAP | Refills: 1 | Status: SHIPPED | OUTPATIENT
Start: 2020-04-13 | End: 2020-06-29

## 2020-04-13 NOTE — PROGRESS NOTES
Consent: Bhavesh Mujica, who was seen by synchronous (real-time) audio-video technology, and/or her healthcare decision maker, is aware that this patient-initiated, Telehealth encounter on 4/13/2020 is a billable service, with coverage as determined by her insurance carrier. She is aware that she may receive a bill and has provided verbal consent to proceed: Yes. Assessment & Plan:   Diagnoses and all orders for this visit:    1. Menopausal syndrome (hot flashes)  -     gabapentin (NEURONTIN) 100 mg capsule; 100mg hs for 3 nights then 200mg hs x 3 nights then 300mg hs    2. Anxiety      Has less fatigue and anxiety controlled but doesn't think paxil helping hot flashes - will stop paxil  Will try gabapentin, potential side effects discussed     Follow-up and Dispositions    Return in about 1 month (around 5/13/2020) for hot flashes. 712  Subjective:   Bhavesh Mujica is a 79 y.o. female who was seen for Anxiety (Pt has been to [facility] which is part of her new ins. // pt has upcoming mammo ) and Medication Evaluation (hacing multiple hot flashes daily which may relate to Paxil )    After decreasing dose of paxil last visit due to fatigue says has been feeling less fatigued but more hot flashes during the day  Not sleeping well. Wakes during the night as well. Hasn't tried gabapentin in past.   Not feeling as anxious or worried as in the past  Hot flashes are bothersome. Prior to Admission medications    Medication Sig Start Date End Date Taking? Authorizing Provider   cholecalciferol (VITAMIN D3) (2,000 UNITS /50 MCG) cap capsule Take  by mouth two (2) times a day. Yes Provider, Historical   gabapentin (NEURONTIN) 100 mg capsule 100mg hs for 3 nights then 200mg hs x 3 nights then 300mg hs 4/13/20  Yes Kell Murrieta MD   PARoxetine (PAXIL) 10 mg tablet Take 0.5 Tabs by mouth nightly. 2/26/20  Yes Kell Murrieta MD   busPIRone (BUSPAR) 5 mg tablet Take  by mouth.    Yes Provider, Historical   losartan (COZAAR) 100 mg tablet TAKE 1 TABLET BY MOUTH ONCE DAILY FOR 90 DAYS 12/18/19  Yes Provider, Historical   hydroCHLOROthiazide (HYDRODIURIL) 25 mg tablet Take 25 mg by mouth daily. Yes Provider, Historical   multivitamin (ONE A DAY) tablet Take 1 Tab by mouth daily. Yes Provider, Historical   docosahexanoic acid/epa (FISH OIL PO) Take  by mouth. Yes Provider, Historical   B.infantis-B.ani-B.long-B.bifi (PROBIOTIC 4X) 10-15 mg TbEC Take  by mouth. Yes Provider, Historical   vitamin E acetate (VITAMIN E PO) Take  by mouth. Yes Provider, Historical   cinnamon bark (CINNAMON) 500 mg cap Take  by mouth. Yes Provider, Historical   cyanocobalamin 1,000 mcg tablet Take 1,000 mcg by mouth daily. Yes Provider, Historical   ubidecarenone (CO Q-10 PO) Take  by mouth. Yes Provider, Historical   aspirin delayed-release 81 mg tablet Take  by mouth daily. Yes Provider, Historical   ondansetron (ZOFRAN ODT) 8 mg disintegrating tablet Take 1 Tab by mouth every eight (8) hours as needed for Nausea, Vomiting or Nausea or Vomiting. 1/28/20   Trav Resendez MD   LORazepam (ATIVAN) 0.5 mg tablet Take 1 tablet by mouth every eight (8) hours as needed for Anxiety. 11/23/14   Shane Ojeda MD     Allergies   Allergen Reactions    Duloxetine Nausea Only    Flagyl [Metronidazole] Hives    Levaquin [Levofloxacin] Hives    Penicillins Nausea Only           ROS    Per HPI    Objective:   Vital Signs: (As obtained by patient/caregiver at home)  There were no vitals taken for this visit.      [INSTRUCTIONS:  \"[x]\" Indicates a positive item  \"[]\" Indicates a negative item  -- DELETE ALL ITEMS NOT EXAMINED]    Constitutional: [x] Appears well-developed and well-nourished [x] No apparent distress      [] Abnormal -     Mental status: [x] Alert and awake  [x] Oriented to person/place/time [x] Able to follow commands    [] Abnormal -     Eyes:   EOM    [x]  Normal    [] Abnormal -   Sclera  [x]  Normal [] Abnormal -          Discharge [x]  None visible   [] Abnormal -     HENT: [x] Normocephalic, atraumatic  [] Abnormal -   [] Mouth/Throat: Mucous membranes are moist    External Ears [x] Normal  [] Abnormal -    Neck: [x] No visualized mass [] Abnormal -     Pulmonary/Chest: [x] Respiratory effort normal   [x] No visualized signs of difficulty breathing or respiratory distress        [] Abnormal -      Musculoskeletal:   [] Normal gait with no signs of ataxia         [x] Normal range of motion of neck        [] Abnormal -     Neurological:        [x] No Facial Asymmetry (Cranial nerve 7 motor function) (limited exam due to video visit)          [x] No gaze palsy        [] Abnormal -          Skin:        [x] No significant exanthematous lesions or discoloration noted on facial skin         [] Abnormal -            Psychiatric:       [x] Normal Affect [] Abnormal -        [] No Hallucinations    Other pertinent observable physical exam findings:-        We discussed the expected course, resolution and complications of the diagnosis(es) in detail. Medication risks, benefits, costs, interactions, and alternatives were discussed as indicated. I advised her to contact the office if her condition worsens, changes or fails to improve as anticipated. She expressed understanding with the diagnosis(es) and plan. Sandra Quarles is a 79 y.o. female being evaluated by a video visit encounter for concerns as above. A caregiver was present when appropriate. Due to this being a TeleHealth encounter (During ONN-44 public health emergency), evaluation of the following organ systems was limited: Vitals/Constitutional/EENT/Resp/CV/GI//MS/Neuro/Skin/Heme-Lymph-Imm.   Pursuant to the emergency declaration under the [facility] waiver authority and the [employer] and [employer] Act, this Virtual  Visit was conducted, with patient's (and/or legal guardian's) consent, to reduce the patient's risk of exposure to COVID-19 and provide necessary medical care. Services were provided through a video synchronous discussion virtually to substitute for in-person clinic visit. Patient and provider were located at their individual homes.         Radu Ch MD

## 2020-04-13 NOTE — PROGRESS NOTES
Radha Angel  Identified pt with two pt identifiers(name and ). Chief Complaint   Patient presents with    Anxiety       1. Have you been to the ER, urgent care clinic since your last visit? Hospitalized since your last visit? NO    2. Have you seen or consulted any other health care providers outside of the [address] [place] [address] since your last visit? Include any pap smears or colon screening. NO      Provider notified of reason for visit, vitals and flowsheets obtained on patients. Patient received paperwork for advance directive during previous visit but has not completed at this time     Reviewed record In preparation for visit, huddled with provider and have obtained necessary documentation      Health Maintenance Due   Topic    Hepatitis C Screening     Colonoscopy     DTaP/Tdap/Td series (1 - Tdap)    Lipid Screen     Breast Cancer Screen Mammogram     Bone Densitometry (Dexa) Screening     Pneumococcal 65+ years (1 of  - PPSV23)       Wt Readings from Last 3 Encounters:   20 145 lb 12.8 oz (66.1 kg)   20 145 lb 3.2 oz (65.9 kg)   01/15/20 149 lb 3.2 oz (67.7 kg)     Temp Readings from Last 3 Encounters:   20 98.1 °F (36.7 °C) (Oral)   20 98.1 °F (36.7 °C) (Oral)   01/15/20 98.2 °F (36.8 °C) (Oral)     BP Readings from Last 3 Encounters:   20 124/72   20 105/65   01/15/20 152/76     Pulse Readings from Last 3 Encounters:   20 100   20 (!) 107   01/15/20 99     There were no vitals filed for this visit. Learning Assessment:  :     No flowsheet data found. Depression Screening:  :     3 most recent PHQ Screens 2020   Little interest or pleasure in doing things Not at all   Feeling down, depressed, irritable, or hopeless Not at all   Total Score PHQ 2 0       Fall Risk Assessment:  :     Fall Risk Assessment, last 12 mths 1/15/2020   Able to walk? Yes   Fall in past 12 months?  No       Abuse Screening:  :     Abuse Screening Questionnaire 1/15/2020   Do you ever feel afraid of your partner? N   Are you in a relationship with someone who physically or mentally threatens you? N   Is it safe for you to go home? Y       ADL Screening:  :     ADL Assessment 1/15/2020   Feeding yourself No Help Needed   Getting from bed to chair No Help Needed   Getting dressed No Help Needed   Bathing or showering No Help Needed   Walk across the room (includes cane/walker) No Help Needed   Using the telphone No Help Needed   Taking your medications No Help Needed   Preparing meals No Help Needed   Managing money (expenses/bills) No Help Needed   Moderately strenuous housework (laundry) No Help Needed   Shopping for personal items (toiletries/medicines) No Help Needed   Shopping for groceries No Help Needed   Driving No Help Needed   Climbing a flight of stairs No Help Needed   Getting to places beyond walking distances No Help Needed         Medication reconciliation up to date and corrected with patient at this time.

## 2020-04-16 ENCOUNTER — TELEPHONE (OUTPATIENT)
Dept: INTERNAL MEDICINE CLINIC | Facility: CLINIC | Age: 70
End: 2020-04-16

## 2020-04-16 NOTE — TELEPHONE ENCOUNTER
Pt reports that since starting  Gabapentin she is waking up in the middle of the night with HA and dizziness. Still feels that way when she wakes up but it subsides during the day. Pt feels tired and weak. Is supposed to titrate up tonight. Would like to know what you recommend.     [phone]

## 2020-05-13 ENCOUNTER — VIRTUAL VISIT (OUTPATIENT)
Dept: INTERNAL MEDICINE CLINIC | Facility: CLINIC | Age: 70
End: 2020-05-13

## 2020-05-13 VITALS — WEIGHT: 145 LBS | BODY MASS INDEX: 25.69 KG/M2 | HEIGHT: 63 IN

## 2020-05-13 DIAGNOSIS — E78.2 MIXED HYPERLIPIDEMIA: ICD-10-CM

## 2020-05-13 DIAGNOSIS — N95.1 MENOPAUSAL SYNDROME (HOT FLASHES): Primary | ICD-10-CM

## 2020-05-13 DIAGNOSIS — F41.9 ANXIETY: ICD-10-CM

## 2020-05-13 RX ORDER — ROSUVASTATIN CALCIUM 5 MG/1
5 TABLET, COATED ORAL
Qty: 90 TAB | Refills: 1 | Status: SHIPPED | OUTPATIENT
Start: 2020-05-13 | End: 2020-11-11

## 2020-05-13 RX ORDER — PAROXETINE 10 MG/1
TABLET, FILM COATED ORAL
COMMUNITY
Start: 2020-04-17 | End: 2020-05-13 | Stop reason: SDUPTHER

## 2020-05-13 RX ORDER — PAROXETINE 10 MG/1
5 TABLET, FILM COATED ORAL DAILY
Qty: 45 TAB | Refills: 1 | Status: SHIPPED | OUTPATIENT
Start: 2020-05-13 | End: 2020-06-29

## 2020-05-13 RX ORDER — ROSUVASTATIN CALCIUM 5 MG/1
TABLET, COATED ORAL
COMMUNITY
End: 2020-05-13 | Stop reason: SDUPTHER

## 2020-05-13 NOTE — PROGRESS NOTES
Phylicia Porter  Identified pt with two pt identifiers(name and ). Chief Complaint   Patient presents with    Medication Evaluation       1. Have you been to the ER, urgent care clinic since your last visit? Hospitalized since your last visit? NO    2. Have you seen or consulted any other health care providers outside of the [address] [place] [address] since your last visit? Include any pap smears or colon screening. NO      Provider notified of reason for visit, vitals and flowsheets obtained on patients. Patient received paperwork for advance directive during previous visit but has not completed at this time     Reviewed record In preparation for visit, huddled with provider and have obtained necessary documentation      Health Maintenance Due   Topic    Hepatitis C Screening     Colonoscopy     DTaP/Tdap/Td series (1 - Tdap)    Lipid Screen     Breast Cancer Screen Mammogram     Bone Densitometry (Dexa) Screening     Pneumococcal 65+ years (1 of 1 - PPSV23)       Wt Readings from Last 3 Encounters:   20 145 lb (65.8 kg)   20 145 lb 12.8 oz (66.1 kg)   20 145 lb 3.2 oz (65.9 kg)     Temp Readings from Last 3 Encounters:   20 98.1 °F (36.7 °C) (Oral)   20 98.1 °F (36.7 °C) (Oral)   01/15/20 98.2 °F (36.8 °C) (Oral)     BP Readings from Last 3 Encounters:   20 124/72   20 105/65   01/15/20 152/76     Pulse Readings from Last 3 Encounters:   20 100   20 (!) 107   01/15/20 99     Vitals:    20 0859   Weight: 145 lb (65.8 kg)   Height: 5' 3\" (1.6 m)   PainSc:   0 - No pain         Learning Assessment:  :     No flowsheet data found. Depression Screening:  :     3 most recent PHQ Screens 2020   Little interest or pleasure in doing things Not at all   Feeling down, depressed, irritable, or hopeless Not at all   Total Score PHQ 2 0       Fall Risk Assessment:  :     Fall Risk Assessment, last 12 mths 1/15/2020   Able to walk?  Yes   Fall in past 12 months? No       Abuse Screening:  :     Abuse Screening Questionnaire 1/15/2020   Do you ever feel afraid of your partner? N   Are you in a relationship with someone who physically or mentally threatens you? N   Is it safe for you to go home? Y       ADL Screening:  :     ADL Assessment 1/15/2020   Feeding yourself No Help Needed   Getting from bed to chair No Help Needed   Getting dressed No Help Needed   Bathing or showering No Help Needed   Walk across the room (includes cane/walker) No Help Needed   Using the telphone No Help Needed   Taking your medications No Help Needed   Preparing meals No Help Needed   Managing money (expenses/bills) No Help Needed   Moderately strenuous housework (laundry) No Help Needed   Shopping for personal items (toiletries/medicines) No Help Needed   Shopping for groceries No Help Needed   Driving No Help Needed   Climbing a flight of stairs No Help Needed   Getting to places beyond walking distances No Help Needed         Medication reconciliation up to date and corrected with patient at this time.

## 2020-06-05 ENCOUNTER — HOSPITAL ENCOUNTER (OUTPATIENT)
Dept: MAMMOGRAPHY | Age: 70
Discharge: HOME | End: 2020-06-05
Payer: MEDICARE

## 2020-06-05 DIAGNOSIS — E28.39 ESTROGEN DEFICIENCY: ICD-10-CM

## 2020-06-05 DIAGNOSIS — Z12.31 ENCOUNTER FOR SCREENING MAMMOGRAM FOR BREAST CANCER: ICD-10-CM

## 2020-06-05 PROCEDURE — 77080 DXA BONE DENSITY AXIAL: CPT

## 2020-06-05 PROCEDURE — 77067 SCR MAMMO BI INCL CAD: CPT

## 2020-06-26 DIAGNOSIS — N95.1 MENOPAUSAL SYNDROME (HOT FLASHES): ICD-10-CM

## 2020-06-26 DIAGNOSIS — F41.9 ANXIETY: ICD-10-CM

## 2020-06-26 DIAGNOSIS — E78.2 MIXED HYPERLIPIDEMIA: ICD-10-CM

## 2020-06-26 RX ORDER — ROSUVASTATIN CALCIUM 5 MG/1
5 TABLET, COATED ORAL
Qty: 90 TAB | Refills: 1 | Status: CANCELLED | OUTPATIENT
Start: 2020-06-26

## 2020-06-26 RX ORDER — PAROXETINE 10 MG/1
5 TABLET, FILM COATED ORAL DAILY
Qty: 45 TAB | Refills: 1 | Status: CANCELLED | OUTPATIENT
Start: 2020-06-26

## 2020-06-26 RX ORDER — BUSPIRONE HYDROCHLORIDE 5 MG/1
10 TABLET ORAL DAILY
Qty: 180 TAB | Refills: 0 | Status: SHIPPED | OUTPATIENT
Start: 2020-06-26 | End: 2021-09-03 | Stop reason: SDUPTHER

## 2020-06-29 ENCOUNTER — VIRTUAL VISIT (OUTPATIENT)
Dept: INTERNAL MEDICINE CLINIC | Facility: CLINIC | Age: 70
End: 2020-06-29

## 2020-06-29 DIAGNOSIS — N95.1 MENOPAUSAL SYNDROME (HOT FLASHES): ICD-10-CM

## 2020-06-29 DIAGNOSIS — F41.9 ANXIETY: ICD-10-CM

## 2020-06-29 RX ORDER — CITALOPRAM 20 MG/1
TABLET, FILM COATED ORAL
Qty: 30 TAB | Refills: 2 | Status: SHIPPED | OUTPATIENT
Start: 2020-06-29 | End: 2021-03-23

## 2020-06-29 RX ORDER — LOSARTAN POTASSIUM AND HYDROCHLOROTHIAZIDE 25; 100 MG/1; MG/1
1 TABLET ORAL DAILY
COMMUNITY
End: 2020-08-14 | Stop reason: SDUPTHER

## 2020-06-29 NOTE — PROGRESS NOTES
Lottie Aggarwal  Identified pt with two pt identifiers(name and ). Chief Complaint   Patient presents with    Medication Evaluation       Reviewed record In preparation for visit and have obtained necessary documentation. 1. Have you been to the ER, urgent care clinic or hospitalized since your last visit? No     2. Have you seen or consulted any other health care providers outside of the [address] [place] [address] since your last visit? Include any pap smears or colon screening. No    Patient has an advance directive. Vitals reviewed with provider. Health Maintenance reviewed:     Health Maintenance Due   Topic    Hepatitis C Screening     Lipid Screen     Colonoscopy     DTaP/Tdap/Td series (1 - Tdap)    Pneumococcal 65+ years (1 of 1 - PPSV23)          Wt Readings from Last 3 Encounters:   20 145 lb (65.8 kg)   20 145 lb 12.8 oz (66.1 kg)   20 145 lb 3.2 oz (65.9 kg)        Temp Readings from Last 3 Encounters:   20 98.1 °F (36.7 °C) (Oral)   20 98.1 °F (36.7 °C) (Oral)   01/15/20 98.2 °F (36.8 °C) (Oral)        BP Readings from Last 3 Encounters:   20 124/72   20 105/65   01/15/20 152/76        Pulse Readings from Last 3 Encounters:   20 100   20 (!) 107   01/15/20 99      There were no vitals filed for this visit. Learning Assessment:   :     No flowsheet data found. Depression Screening:   :       3 most recent PHQ Screens 2020   Little interest or pleasure in doing things Not at all   Feeling down, depressed, irritable, or hopeless Not at all   Total Score PHQ 2 0        Fall Risk Assessment:   :       Fall Risk Assessment, last 12 mths 1/15/2020   Able to walk? Yes   Fall in past 12 months? No        Abuse Screening:   :       Abuse Screening Questionnaire 1/15/2020   Do you ever feel afraid of your partner? N   Are you in a relationship with someone who physically or mentally threatens you?  N   Is it safe for you to go home? Y        ADL Screening:   :       ADL Assessment 1/15/2020   Feeding yourself No Help Needed   Getting from bed to chair No Help Needed   Getting dressed No Help Needed   Bathing or showering No Help Needed   Walk across the room (includes cane/walker) No Help Needed   Using the telphone No Help Needed   Taking your medications No Help Needed   Preparing meals No Help Needed   Managing money (expenses/bills) No Help Needed   Moderately strenuous housework (laundry) No Help Needed   Shopping for personal items (toiletries/medicines) No Help Needed   Shopping for groceries No Help Needed   Driving No Help Needed   Climbing a flight of stairs No Help Needed   Getting to places beyond walking distances No Help Needed

## 2020-06-29 NOTE — PROGRESS NOTES
David Hurley is a 79 y.o. female who was seen by synchronous (real-time) audio-video technology on 6/29/2020. Consent: David Hurley, who was seen by synchronous (real-time) audio-video technology, and/or her healthcare decision maker, is aware that this patient-initiated, Telehealth encounter on 6/29/2020 is a billable service, with coverage as determined by her insurance carrier. She is aware that she may receive a bill and has provided verbal consent to proceed: Yes. This visit was completed virtually using doxy. me    Assessment & Plan:   Diagnoses and all orders for this visit:    1. Menopausal syndrome (hot flashes)    2. Anxiety  -     citalopram (CELEXA) 20 mg tablet; 10mg daily x one week then can increase to 20mg after one week        add celexa, stop paxil  Hopefully celexa will help hot flashes, mood  Continue buspar for now    Keep appt in august     Subjective:   David uHrley is a 79 y.o. female who was seen for Medication Evaluation    Works 2.5 days per week - as a  - enjoys her job. buspar helps with anxiety but says that paxil doesn't seem to be helping - mood swings, hot flashes not any better. Gets multiple hot flashes per day since off HRT. Has been more agitated as well. Prior to Admission medications    Medication Sig Start Date End Date Taking? Authorizing Provider   citalopram (CELEXA) 20 mg tablet 10mg daily x one week then can increase to 20mg after one week 6/29/20  Yes Destini Alexis MD   losartan-hydroCHLOROthiazide [facility]) 100-25 mg per tablet Take 1 Tab by mouth daily. Yes Provider, Historical   busPIRone (BUSPAR) 5 mg tablet Take 2 Tabs by mouth daily. 6/26/20  Yes Michael Mejía MD   rosuvastatin (CRESTOR) 5 mg tablet Take 1 Tab by mouth nightly. 5/13/20  Yes Destini Alexis MD   cholecalciferol (VITAMIN D3) (2,000 UNITS /50 MCG) cap capsule Take  by mouth two (2) times a day.    Yes Provider, Historical   multivitamin (ONE A DAY) tablet Take 1 Tab by mouth daily. Yes Provider, Historical   docosahexanoic acid/epa (FISH OIL PO) Take  by mouth. Yes Provider, Historical   B.infantis-B.ani-B.long-B.bifi (PROBIOTIC 4X) 10-15 mg TbEC Take  by mouth. Yes Provider, Historical   vitamin E acetate (VITAMIN E PO) Take  by mouth. Yes Provider, Historical   cinnamon bark (CINNAMON) 500 mg cap Take  by mouth. Yes Provider, Historical   cyanocobalamin 1,000 mcg tablet Take 1,000 mcg by mouth daily. Yes Provider, Historical   ubidecarenone (CO Q-10 PO) Take  by mouth. Yes Provider, Historical   aspirin delayed-release 81 mg tablet Take  by mouth daily. Yes Provider, Historical   PARoxetine (PAXIL) 10 mg tablet Take 0.5 Tabs by mouth daily. 5/13/20 6/29/20  Destini Alexis MD   gabapentin (NEURONTIN) 100 mg capsule 100mg hs for 3 nights then 200mg hs x 3 nights then 300mg hs 4/13/20 6/29/20  Destini Alexis MD   ondansetron (ZOFRAN ODT) 8 mg disintegrating tablet Take 1 Tab by mouth every eight (8) hours as needed for Nausea, Vomiting or Nausea or Vomiting. 1/28/20   Destini Alexis MD   losartan (COZAAR) 100 mg tablet TAKE 1 TABLET BY MOUTH ONCE DAILY FOR 90 DAYS 12/18/19 6/29/20  Provider, Historical   hydroCHLOROthiazide (HYDRODIURIL) 25 mg tablet Take 25 mg by mouth daily. 6/29/20  Provider, Historical   LORazepam (ATIVAN) 0.5 mg tablet Take 1 tablet by mouth every eight (8) hours as needed for Anxiety. 11/23/14   Gus Dugan MD     Allergies   Allergen Reactions    Duloxetine Nausea Only    Flagyl [Metronidazole] Hives    Levaquin [Levofloxacin] Hives    Penicillins Nausea Only           ROS  Per HPI    Objective:   Vital Signs: (As obtained by patient/caregiver at home)  There were no vitals taken for this visit.      [INSTRUCTIONS:  \"[x]\" Indicates a positive item  \"[]\" Indicates a negative item  -- DELETE ALL ITEMS NOT EXAMINED]    Constitutional: [x] Appears well-developed and well-nourished [x] No apparent distress      [] Abnormal -     Mental status: [x] Alert and awake  [x] Oriented to person/place/time [x] Able to follow commands    [] Abnormal -     Eyes:   EOM    [x]  Normal    [] Abnormal -   Sclera  [x]  Normal    [] Abnormal -          Discharge [x]  None visible   [] Abnormal -     HENT: [x] Normocephalic, atraumatic  [] Abnormal -   [x] Mouth/Throat: Mucous membranes are moist    External Ears [x] Normal  [] Abnormal -    Neck: [x] No visualized mass [] Abnormal -     Pulmonary/Chest: [x] Respiratory effort normal   [x] No visualized signs of difficulty breathing or respiratory distress        [] Abnormal -      Musculoskeletal:   [] Normal gait with no signs of ataxia         [x] Normal range of motion of neck        [] Abnormal -     Neurological:        [x] No Facial Asymmetry (Cranial nerve 7 motor function) (limited exam due to video visit)          [x] No gaze palsy        [] Abnormal -          Skin:        [x] No significant exanthematous lesions or discoloration noted on facial skin         [] Abnormal -            Psychiatric:       [x] Normal Affect [] Abnormal -        [x] No Hallucinations    Other pertinent observable physical exam findings:-        We discussed the expected course, resolution and complications of the diagnosis(es) in detail. Medication risks, benefits, costs, interactions, and alternatives were discussed as indicated. I advised her to contact the office if her condition worsens, changes or fails to improve as anticipated. She expressed understanding with the diagnosis(es) and plan. Lottie Aggarwal is a 79 y.o. female who was evaluated by a video visit encounter for concerns as above. Patient identification was verified prior to start of the visit. A caregiver was present when appropriate.  Due to this being a TeleHealth encounter (During ITOTE-21 public health emergency), evaluation of the following organ systems was limited: Vitals/Constitutional/EENT/Resp/CV/GI//MS/Neuro/Skin/Heme-Lymph-Imm. Pursuant to the emergency declaration under the [address] [place] [address], [facility] waiver authority and the [employer] Act, this Virtual  Visit was conducted, with patient's (and/or legal guardian's) consent, to reduce the patient's risk of exposure to COVID-19 and provide necessary medical care. Services were provided through a video synchronous discussion virtually to substitute for in-person clinic visit. Patient and provider were located at their individual homes.       Noelle Ann MD

## 2020-08-14 DIAGNOSIS — I10 ESSENTIAL HYPERTENSION: Primary | ICD-10-CM

## 2020-08-14 RX ORDER — LOSARTAN POTASSIUM AND HYDROCHLOROTHIAZIDE 25; 100 MG/1; MG/1
1 TABLET ORAL DAILY
Qty: 90 TAB | Refills: 3 | Status: SHIPPED | OUTPATIENT
Start: 2020-08-14 | End: 2021-08-24

## 2020-08-14 NOTE — TELEPHONE ENCOUNTER
PCP: Theresa Reddy MD    Last appt: 6/29/20    Future Appointments   Date Time Provider Linda Starr   8/27/2020  2:30 PM Theresa Reddy MD [facility] AMB       Requested Prescriptions     Pending Prescriptions Disp Refills    losartan-hydroCHLOROthiazide (HYZAAR) 100-25 mg per tablet 90 Tab 1     Sig: Take 1 Tab by mouth daily.

## 2020-10-21 ENCOUNTER — CLINICAL SUPPORT (OUTPATIENT)
Dept: INTERNAL MEDICINE CLINIC | Age: 70
End: 2020-10-21
Payer: MEDICARE

## 2020-10-21 DIAGNOSIS — Z23 NEEDS FLU SHOT: Primary | ICD-10-CM

## 2020-10-21 PROCEDURE — G0008 ADMIN INFLUENZA VIRUS VAC: HCPCS | Performed by: INTERNAL MEDICINE

## 2020-10-21 PROCEDURE — 90694 VACC AIIV4 NO PRSRV 0.5ML IM: CPT | Performed by: INTERNAL MEDICINE

## 2020-10-21 NOTE — PATIENT INSTRUCTIONS
Vaccine Information Statement Influenza (Flu) Vaccine (Inactivated or Recombinant): What You Need to Know Many Vaccine Information Statements are available in Yoruba and other languages. See www.immunize.org/vis Hojas de información sobre vacunas están disponibles en español y en muchos otros idiomas. Visite www.immunize.org/vis 1. Why get vaccinated? Influenza vaccine can prevent influenza (flu). Flu is a contagious disease that spreads around the [facility] every year, usually between October and May. Anyone can get the flu, but it is more dangerous for some people. Infants and young children, people 72years of age and older, pregnant women, and people with certain health conditions or a weakened immune system are at greatest risk of flu complications. Pneumonia, bronchitis, sinus infections and ear infections are examples of flu-related complications. If you have a medical condition, such as heart disease, cancer or diabetes, flu can make it worse. Flu can cause fever and chills, sore throat, muscle aches, fatigue, cough, headache, and runny or stuffy nose. Some people may have vomiting and diarrhea, though this is more common in children than adults. Each year thousands of people in the [facility] die from flu, and many more are hospitalized. Flu vaccine prevents millions of illnesses and flu-related visits to the doctor each year. 2. Influenza vaccines CDC recommends everyone 10months of age and older get vaccinated every flu season. Children 6 months through 6years of age may need 2 doses during a single flu season. Everyone else needs only 1 dose each flu season. It takes about 2 weeks for protection to develop after vaccination. There are many flu viruses, and they are always changing. Each year a new flu vaccine is made to protect against three or four viruses that are likely to cause disease in the upcoming flu season.  Even when the vaccine doesnt exactly match these viruses, it may still provide some protection. Influenza vaccine does not cause flu. Influenza vaccine may be given at the same time as other vaccines. 3. Talk with your health care provider Tell your vaccine provider if the person getting the vaccine: 
 Has had an allergic reaction after a previous dose of influenza vaccine, or has any severe, life-threatening allergies.  Has ever had Guillain-Barré Syndrome (also called GBS). In some cases, your health care provider may decide to postpone influenza vaccination to a future visit. People with minor illnesses, such as a cold, may be vaccinated. People who are moderately or severely ill should usually wait until they recover before getting influenza vaccine. Your health care provider can give you more information. 4. Risks of a reaction  Soreness, redness, and swelling where shot is given, fever, muscle aches, and headache can happen after influenza vaccine.  There may be a very small increased risk of Guillain-Barré Syndrome (GBS) after inactivated influenza vaccine (the flu shot). [employer] children who get the flu shot along with pneumococcal vaccine (PCV13), and/or DTaP vaccine at the same time might be slightly more likely to have a seizure caused by fever. Tell your health care provider if a child who is getting flu vaccine has ever had a seizure. People sometimes faint after medical procedures, including vaccination. Tell your provider if you feel dizzy or have vision changes or ringing in the ears. As with any medicine, there is a very remote chance of a vaccine causing a severe allergic reaction, other serious injury, or death. 5. What if there is a serious problem? An allergic reaction could occur after the vaccinated person leaves the clinic.  If you see signs of a severe allergic reaction (hives, swelling of the face and throat, difficulty breathing, a fast heartbeat, dizziness, or weakness), call 9-1-1 and get the person to the nearest hospital. 
 
For other signs that concern you, call your health care provider. Adverse reactions should be reported to the Vaccine Adverse Event Reporting System (VAERS). Your health care provider will usually file this report, or you can do it yourself. Visit the VAERS website at www.vaers. hhs.gov or call 8-949.970.3510. VAERS is only for reporting reactions, and VAERS staff do not give medical advice. 6. The National Vaccine Injury Compensation Program 
 
The [facility] Vaccine Injury Compensation Program (VICP) is a federal program that was created to compensate people who may have been injured by certain vaccines. Visit the VICP website at www.hrsa.gov/vaccinecompensation or call 6-476.731.8425 to learn about the program and about filing a claim. There is a time limit to file a claim for compensation. 7. How can I learn more?  Ask your health care provider.  Call your local or state health department.  Contact the Centers for Disease Control and Prevention (CDC): 
- Call 4-638.409.2205 (9-311-BVH-INFO) or 
- Visit CDCs influenza website at www.cdc.gov/flu Vaccine Information Statement (Interim) Inactivated Influenza Vaccine 8/15/2019 
[address] Hedrick [id number] Department of Health and [employer] Centers for Disease Control and Prevention Office Use Only

## 2020-10-21 NOTE — PROGRESS NOTES
After verbal order read back of Dr. Eve Nguyen, patient received High Dose Flu Shot (Adjuvanted Fluad) in left deltoid. [address]: 50022-592-42 Lot: [id number] Exp 05/29/21. Patient tolerated procedure without complaints and received VIS.

## 2020-11-11 DIAGNOSIS — E78.2 MIXED HYPERLIPIDEMIA: ICD-10-CM

## 2020-11-11 RX ORDER — ROSUVASTATIN CALCIUM 5 MG/1
TABLET, COATED ORAL
Qty: 90 TAB | Refills: 0 | Status: SHIPPED | OUTPATIENT
Start: 2020-11-11 | End: 2021-02-05

## 2021-02-05 DIAGNOSIS — E78.2 MIXED HYPERLIPIDEMIA: ICD-10-CM

## 2021-02-05 RX ORDER — ROSUVASTATIN CALCIUM 5 MG/1
TABLET, COATED ORAL
Qty: 90 TAB | Refills: 0 | Status: SHIPPED | OUTPATIENT
Start: 2021-02-05 | End: 2021-05-06

## 2021-03-23 ENCOUNTER — OFFICE VISIT (OUTPATIENT)
Dept: INTERNAL MEDICINE CLINIC | Age: 71
End: 2021-03-23
Payer: MEDICARE

## 2021-03-23 VITALS
WEIGHT: 157.2 LBS | SYSTOLIC BLOOD PRESSURE: 124 MMHG | DIASTOLIC BLOOD PRESSURE: 70 MMHG | HEART RATE: 82 BPM | OXYGEN SATURATION: 97 % | TEMPERATURE: 98.4 F | HEIGHT: 63 IN | BODY MASS INDEX: 27.85 KG/M2 | RESPIRATION RATE: 16 BRPM

## 2021-03-23 DIAGNOSIS — I10 ESSENTIAL HYPERTENSION: ICD-10-CM

## 2021-03-23 DIAGNOSIS — Z00.00 MEDICARE ANNUAL WELLNESS VISIT, SUBSEQUENT: Primary | ICD-10-CM

## 2021-03-23 DIAGNOSIS — F41.9 ANXIETY: ICD-10-CM

## 2021-03-23 DIAGNOSIS — E78.2 MIXED HYPERLIPIDEMIA: ICD-10-CM

## 2021-03-23 DIAGNOSIS — R20.2 PARESTHESIA OF BOTH HANDS: ICD-10-CM

## 2021-03-23 DIAGNOSIS — Z86.010 HISTORY OF COLON POLYPS: ICD-10-CM

## 2021-03-23 DIAGNOSIS — M25.531 WRIST PAIN, RIGHT: ICD-10-CM

## 2021-03-23 PROCEDURE — 3017F COLORECTAL CA SCREEN DOC REV: CPT | Performed by: INTERNAL MEDICINE

## 2021-03-23 PROCEDURE — G8536 NO DOC ELDER MAL SCRN: HCPCS | Performed by: INTERNAL MEDICINE

## 2021-03-23 PROCEDURE — 1090F PRES/ABSN URINE INCON ASSESS: CPT | Performed by: INTERNAL MEDICINE

## 2021-03-23 PROCEDURE — G0439 PPPS, SUBSEQ VISIT: HCPCS | Performed by: INTERNAL MEDICINE

## 2021-03-23 PROCEDURE — G8419 CALC BMI OUT NRM PARAM NOF/U: HCPCS | Performed by: INTERNAL MEDICINE

## 2021-03-23 PROCEDURE — 99214 OFFICE O/P EST MOD 30 MIN: CPT | Performed by: INTERNAL MEDICINE

## 2021-03-23 PROCEDURE — G8427 DOCREV CUR MEDS BY ELIG CLIN: HCPCS | Performed by: INTERNAL MEDICINE

## 2021-03-23 PROCEDURE — G8399 PT W/DXA RESULTS DOCUMENT: HCPCS | Performed by: INTERNAL MEDICINE

## 2021-03-23 PROCEDURE — 1101F PT FALLS ASSESS-DOCD LE1/YR: CPT | Performed by: INTERNAL MEDICINE

## 2021-03-23 PROCEDURE — G8510 SCR DEP NEG, NO PLAN REQD: HCPCS | Performed by: INTERNAL MEDICINE

## 2021-03-23 PROCEDURE — G9899 SCRN MAM PERF RSLTS DOC: HCPCS | Performed by: INTERNAL MEDICINE

## 2021-03-23 PROCEDURE — G8754 DIAS BP LESS 90: HCPCS | Performed by: INTERNAL MEDICINE

## 2021-03-23 PROCEDURE — G8752 SYS BP LESS 140: HCPCS | Performed by: INTERNAL MEDICINE

## 2021-03-23 NOTE — PROGRESS NOTES
HPI  Ms. Cantu Seen is a 70y.o. year old female, she is seen today for follow up insomnia. No chest pain, sob, dizziness, weakness, lightheadedness. Quit smoking on April 1. Only thing she takes for anxiety is buspar as needed. Has intermittent right wrist pain, fingers will go numb right hand when she sleeps. Will wake with sensation at time. No HA or vision changes. Chief Complaint   Patient presents with    Sleep Problem     Room 2A //         Prior to Admission medications    Medication Sig Start Date End Date Taking? Authorizing Provider   CALCIUM PO Take  by mouth. Yes Provider, Historical   rosuvastatin (CRESTOR) 5 mg tablet Take 1 tablet by mouth nightly 2/5/21  Yes Primo Harris MD   losartan-hydroCHLOROthiazide [facility]) 100-25 mg per tablet Take 1 Tab by mouth daily. 8/14/20  Yes Primo Harris MD   busPIRone (BUSPAR) 5 mg tablet Take 2 Tabs by mouth daily. 6/26/20  Yes Mellissa Mccann MD   cholecalciferol (VITAMIN D3) (2,000 UNITS /50 MCG) cap capsule Take  by mouth two (2) times a day. Yes Provider, Historical   multivitamin (ONE A DAY) tablet Take 1 Tab by mouth daily. Yes Provider, Historical   docosahexanoic acid/epa (FISH OIL PO) Take  by mouth. Yes Provider, Historical   B.infantis-B.ani-B.long-B.bifi (PROBIOTIC 4X) 10-15 mg TbEC Take  by mouth. Yes Provider, Historical   vitamin E acetate (VITAMIN E PO) Take  by mouth. Yes Provider, Historical   cinnamon bark (CINNAMON) 500 mg cap Take  by mouth. Yes Provider, Historical   cyanocobalamin 1,000 mcg tablet Take 1,000 mcg by mouth daily. Yes Provider, Historical   ubidecarenone (CO Q-10 PO) Take  by mouth. Yes Provider, Historical   aspirin delayed-release 81 mg tablet Take  by mouth daily.    Yes Provider, Historical   ondansetron (ZOFRAN ODT) 8 mg disintegrating tablet Take 1 Tab by mouth every eight (8) hours as needed for Nausea, Vomiting or Nausea or Vomiting. 1/28/20   Primo Harris MD Allergies   Allergen Reactions    Duloxetine Nausea Only    Flagyl [Metronidazole] Hives    Levaquin [Levofloxacin] Hives    Penicillins Nausea Only         REVIEW OF SYSTEMS:  Per HPI    PHYSICAL EXAM:  Visit Vitals  /70 (BP 1 Location: Right arm, BP Patient Position: Sitting, BP Cuff Size: Adult)   Pulse 82   Temp 98.4 °F (36.9 °C) (Oral)   Resp 16   Ht 5' 3\" (1.6 m)   Wt 157 lb 3.2 oz (71.3 kg)   SpO2 97%   BMI 27.85 kg/m²     Constitutional: Appears well-developed and well-nourished. No distress. HENT:   Head: Normocephalic and atraumatic. Eyes: No scleral icterus. Neck: no lad, no tm, supple   Cardiovascular: Normal S1/S2, regular rhythm. No murmurs, rubs, or gallops. Pulmonary/Chest: Effort normal and breath sounds normal. No respiratory distress. No wheezes, rhonchi, or rales. Abdomen: Soft, NT/ND, +BS, no rebound or guarding, no masses, no HSM appreciated. Ext: No edema. Neurological: Alert. Strength 5/5 b/l UE, tinels and phalen's neg both wrists, sensation intact to light touch right hand  MSK: mild pain on palpation right radial head with slight bony enlargement  Psychiatric: Normal mood and affect. Behavior is normal.     Lab Results   Component Value Date/Time    Sodium 141 06/17/2015 04:06 AM    Potassium 3.7 06/17/2015 04:06 AM    Chloride 110 (H) 06/17/2015 04:06 AM    CO2 23 06/17/2015 04:06 AM    Anion gap 8 06/17/2015 04:06 AM    Glucose 147 (H) 06/17/2015 04:06 AM    BUN 14 06/17/2015 04:06 AM    Creatinine 0.72 06/17/2015 04:06 AM    BUN/Creatinine ratio 19 06/17/2015 04:06 AM    GFR est AA >60 06/17/2015 04:06 AM    GFR est non-AA >60 06/17/2015 04:06 AM    Calcium 8.2 (L) 06/17/2015 04:06 AM    Bilirubin, total 0.4 06/16/2015 02:02 PM    Alk.  phosphatase 84 06/16/2015 02:02 PM    Protein, total 7.6 06/16/2015 02:02 PM    Albumin 3.5 06/16/2015 02:02 PM    Globulin 4.1 (H) 06/16/2015 02:02 PM    A-G Ratio 0.9 (L) 06/16/2015 02:02 PM    ALT (SGPT) 22 06/16/2015 02:02 PM     No results found for: HBA1C, HGBE8, TMF0AGLQ, XEB4DPZQ   No results found for: CHOL, CHOLPOCT, CHOLX, CHLST, CHOLV, HDL, HDLPOC, HDLP, LDL, LDLCPOC, LDLC, DLDLP, VLDLC, VLDL, TGLX, TRIGL, TRIGP, TGLPOCT, CHHD, CHHDX       ASSESSMENT/PLAN  Diagnoses and all orders for this visit:    1. Medicare annual wellness visit, subsequent    2. Mixed hyperlipidemia  -     METABOLIC PANEL, COMPREHENSIVE; Future  -     LIPID PANEL; Future    3. Anxiety  -     TSH AND FREE T4; Future  Controlled on current regimen, continue   4. Essential hypertension  Controlled on current regimen, continue   5. History of colon polyps  -     REFERRAL TO GASTROENTEROLOGY    6. Paresthesia of both hands  ?early CTS - monitor for now, only intermittent  7. Wrist pain, right  May be early OA - tylenol prn - if worse see ortho        Health Maintenance Due   Topic Date Due    Hepatitis C Screening  Never done    Lipid Screen  Never done    COVID-19 Vaccine (1) Never done    DTaP/Tdap/Td series (1 - Tdap) Never done    Shingrix Vaccine Age 50> (1 of 2) Never done    Colorectal Cancer Screening Combo  Never done    Pneumococcal 65+ years (1 of 1 - PPSV23) Never done        Follow-up and Dispositions    · Return in about 6 months (around 9/23/2021) for bp. Reviewed plan of care. Patient has provided input and agrees with goals. The nurse provided the patient and/or family with advanced directive information if needed and encouraged the patient to provide a copy to the office when available. This is the Subsequent Medicare Annual Wellness Exam, performed 12 months or more after the Initial AWV or the last Subsequent AWV    I have reviewed the patient's medical history in detail and updated the computerized patient record.      Depression Risk Factor Screening:     3 most recent PHQ Screens 3/23/2021   Little interest or pleasure in doing things Not at all   Feeling down, depressed, irritable, or hopeless Not at all   Total Score PHQ 2 0       Alcohol Risk Screen    Do you average more than 1 drink per night or more than 7 drinks a week:  No    On any one occasion in the past three months have you have had more than 3 drinks containing alcohol:  No        Functional Ability and Level of Safety:    Hearing: Hearing is good. Activities of Daily Living: The home contains: no safety equipment. Patient does total self care      Ambulation: with no difficulty     Fall Risk:  Fall Risk Assessment, last 12 mths 3/23/2021   Able to walk? Yes   Fall in past 12 months? 0      Abuse Screen:  Patient is not abused       Cognitive Screening    Has your family/caregiver stated any concerns about your memory: no     Cognitive Screening: N/A    Assessment/Plan   Education and counseling provided:  Are appropriate based on today's review and evaluation    Diagnoses and all orders for this visit:    1. Medicare annual wellness visit, subsequent    2. Mixed hyperlipidemia  -     METABOLIC PANEL, COMPREHENSIVE; Future  -     LIPID PANEL; Future    3. Anxiety  -     TSH AND FREE T4; Future    4. Essential hypertension    5. History of colon polyps  -     REFERRAL TO GASTROENTEROLOGY    6. Paresthesia of both hands    7.  Wrist pain, right        Health Maintenance Due     Health Maintenance Due   Topic Date Due    Hepatitis C Screening  Never done    Lipid Screen  Never done    COVID-19 Vaccine (1) Never done    DTaP/Tdap/Td series (1 - Tdap) Never done    Shingrix Vaccine Age 50> (1 of 2) Never done    Colorectal Cancer Screening Combo  Never done    Pneumococcal 65+ years (1 of 1 - PPSV23) Never done       Patient Care Team   Patient Care Team:  Jax Lindquist MD as PCP - General (Internal Medicine)  Jax Lindquist MD as PCP - REHABILITATION [facility] Empaneled Provider    History     Patient Active Problem List   Diagnosis Code    Colitis K52.9    Mixed hyperlipidemia E78.2    Menopausal syndrome (hot flashes) N95.1    Anxiety F41.9 Past Medical History:   Diagnosis Date    Anxiety     Bowel disease     colitis ( hx of)    GERD (gastroesophageal reflux disease)     HTN (hypertension)     Menopause 1991      Past Surgical History:   Procedure Laterality Date    HX BUNIONECTOMY Left     HX HYSTERECTOMY  1991    HX LITHOTRIPSY      HX OOPHORECTOMY Bilateral 1991    HX OTHER SURGICAL      colonoscopy - per pt around 2010     Current Outpatient Medications   Medication Sig Dispense Refill    CALCIUM PO Take  by mouth.  rosuvastatin (CRESTOR) 5 mg tablet Take 1 tablet by mouth nightly 90 Tab 0    losartan-hydroCHLOROthiazide (HYZAAR) 100-25 mg per tablet Take 1 Tab by mouth daily. 90 Tab 3    busPIRone (BUSPAR) 5 mg tablet Take 2 Tabs by mouth daily. 180 Tab 0    cholecalciferol (VITAMIN D3) (2,000 UNITS /50 MCG) cap capsule Take  by mouth two (2) times a day.  multivitamin (ONE A DAY) tablet Take 1 Tab by mouth daily.  docosahexanoic acid/epa (FISH OIL PO) Take  by mouth.  B.infantis-B.ani-B.long-B.bifi (PROBIOTIC 4X) 10-15 mg TbEC Take  by mouth.  vitamin E acetate (VITAMIN E PO) Take  by mouth.  cinnamon bark (CINNAMON) 500 mg cap Take  by mouth.  cyanocobalamin 1,000 mcg tablet Take 1,000 mcg by mouth daily.  ubidecarenone (CO Q-10 PO) Take  by mouth.  aspirin delayed-release 81 mg tablet Take  by mouth daily.  ondansetron (ZOFRAN ODT) 8 mg disintegrating tablet Take 1 Tab by mouth every eight (8) hours as needed for Nausea, Vomiting or Nausea or Vomiting.  20 Tab 0     Allergies   Allergen Reactions    Duloxetine Nausea Only    Flagyl [Metronidazole] Hives    Levaquin [Levofloxacin] Hives    Penicillins Nausea Only       Family History   Problem Relation Age of Onset    Cancer Mother         brain    Cancer Maternal Grandmother         uterine     Social History     Tobacco Use    Smoking status: Former Smoker     Years: 30.00     Quit date: 4/12/2020     Years since quittin.9    Smokeless tobacco: Never Used   Substance Use Topics    Alcohol use: Yes     Comment: 1 glass vodka monthly

## 2021-03-23 NOTE — PATIENT INSTRUCTIONS
Medicare Wellness Visit, Female The best way to live healthy is to have a lifestyle where you eat a well-balanced diet, exercise regularly, limit alcohol use, and quit all forms of tobacco/nicotine, if applicable. Regular preventive services are another way to keep healthy. Preventive services (vaccines, screening tests, monitoring & exams) can help personalize your care plan, which helps you manage your own care. Screening tests can find health problems at the earliest stages, when they are easiest to treat. [facility] follows the current, evidence-based guidelines published by the [facility]ksandar Chandler ([facility]) when recommending preventive services for our patients. Because we follow these guidelines, sometimes recommendations change over time as research supports it. (For example, mammograms used to be recommended annually. Even though Medicare will still pay for an annual mammogram, the newer guidelines recommend a mammogram every two years for women of average risk). Of course, you and your doctor may decide to screen more often for some diseases, based on your risk and your co-morbidities (chronic disease you are already diagnosed with). Preventive services for you include: - Medicare offers their members a free annual wellness visit, which is time for you and your primary care provider to discuss and plan for your preventive service needs. Take advantage of this benefit every year! 
-All adults over the age of 72 should receive the recommended pneumonia vaccines. Current USPSTF guidelines recommend a series of two vaccines for the best pneumonia protection.  
-All adults should have a flu vaccine yearly and a tetanus vaccine every 10 years.  
-All adults age 48 and older should receive the shingles vaccines (series of two vaccines).      
-All adults age 38-68 who are overweight should have a diabetes screening test once every three years.  
-All adults born between [birth year] and 1965 should be screened once for Hepatitis C. 
-Other screening tests and preventive services for persons with diabetes include: an eye exam to screen for diabetic retinopathy, a kidney function test, a foot exam, and stricter control over your cholesterol.  
-Cardiovascular screening for adults with routine risk involves an electrocardiogram (ECG) at intervals determined by your doctor.  
-Colorectal cancer screenings should be done for adults age 54-65 with no increased risk factors for colorectal cancer. There are a number of acceptable methods of screening for this type of cancer. Each test has its own benefits and drawbacks. Discuss with your doctor what is most appropriate for you during your annual wellness visit. The different tests include: colonoscopy (considered the best screening method), a fecal occult blood test, a fecal DNA test, and sigmoidoscopy. 
 
-A bone mass density test is recommended when a woman turns 65 to screen for osteoporosis. This test is only recommended one time, as a screening. Some providers will use this same test as a disease monitoring tool if you already have osteoporosis. -Breast cancer screenings are recommended every other year for women of normal risk, age 54-69. 
-Cervical cancer screenings for women over age 72 are only recommended with certain risk factors. Here is a list of your current Health Maintenance items (your personalized list of preventive services) with a due date: 
Health Maintenance Due Topic Date Due  
 Hepatitis C Test  Never done  Cholesterol Test   Never done  COVID-19 Vaccine (1) Never done  DTaP/Tdap/Td  (1 - Tdap) Never done  Shingles Vaccine (1 of 2) Never done  Colorectal Screening  Never done  Pneumococcal Vaccine (1 of 1 - PPSV23) Never done

## 2021-03-23 NOTE — LETTER
4/9/2021 2:53 PM 
 
Ms. Mahesh Castelan [id number] [facility] [address] Results for orders placed or performed in visit on 03/23/21 METABOLIC PANEL, COMPREHENSIVE Result Value Ref Range Glucose 88 65 - 99 mg/dL BUN 23 8 - 27 mg/dL Creatinine 1.07 (H) 0.57 - 1.00 mg/dL GFR est non-AA 52 (L) >59 mL/min/1.73 GFR est AA 60 >59 mL/min/1.73  
 BUN/Creatinine ratio 21 12 - 28 Sodium 139 134 - 144 mmol/L Potassium 3.9 3.5 - 5.2 mmol/L Chloride 101 96 - 106 mmol/L  
 CO2 24 20 - 29 mmol/L Calcium 9.4 8.7 - 10.3 mg/dL Protein, total 6.8 6.0 - 8.5 g/dL Albumin 4.6 3.7 - 4.7 g/dL GLOBULIN, TOTAL 2.2 1.5 - 4.5 g/dL A-G Ratio 2.1 1.2 - 2.2 Bilirubin, total 0.2 0.0 - 1.2 mg/dL Alk. phosphatase 78 39 - 117 IU/L  
 AST (SGOT) 19 0 - 40 IU/L  
 ALT (SGPT) 17 0 - 32 IU/L  
LIPID PANEL Result Value Ref Range Cholesterol, total 178 100 - 199 mg/dL Triglyceride 142 0 - 149 mg/dL HDL Cholesterol 64 >39 mg/dL VLDL, calculated 24 5 - 40 mg/dL LDL, calculated 90 0 - 99 mg/dL TSH AND FREE T4 Result Value Ref Range TSH 2.070 0.450 - 4.500 uIU/mL T4, Free 1.35 0.82 - 1.77 ng/dL Written by Nhung Draper MD on 4/1/2021  2:57 PM 
Normal thyroid and cholesterol labs. You appear slightly dehydrated by labs and I would like you to increase water intake. Sincerely, Ronaldo Gomez MD

## 2021-03-23 NOTE — PROGRESS NOTES
Delmar Smith  Identified pt with two pt identifiers(name and ). Chief Complaint   Patient presents with    Sleep Problem     Room 2A //        Reviewed record In preparation for visit and have obtained necessary documentation. 1. Have you been to the ER, urgent care clinic or hospitalized since your last visit? No     2. Have you seen or consulted any other health care providers outside of the [address] [place] [address] since your last visit? Include any pap smears or colon screening. No    Patient has an advance directive. Vitals reviewed with provider. Health Maintenance reviewed:     Health Maintenance Due   Topic    Hepatitis C Screening     Lipid Screen     COVID-19 Vaccine (1)    DTaP/Tdap/Td series (1 - Tdap)    Shingrix Vaccine Age 49> (1 of 2)    Colorectal Cancer Screening Combo     Pneumococcal 65+ years (1 of 1 - PPSV23)    Medicare Yearly Exam     bed  Wt Readings from Last 3 Encounters:   21 157 lb 3.2 oz (71.3 kg)   20 145 lb (65.8 kg)   20 145 lb 12.8 oz (66.1 kg)   er)  Temp Readings from Last 3 Encounters:   21 98.4 °F (36.9 °C) (Oral)   20 98.1 °F (36.7 °C) (Oral)   20 98.1 °F (36.7 °C) (Oral)   es/  BP Readings from Last 3 Encounters:   21 124/70   20 124/72   20 105/65   or   Pulse Readings from Last 3 Encounters:   21 82   20 100   20 (!) 107   sepideh  Vitals:    21 0850   BP: 124/70   Pulse: 82   Resp: 16   Temp: 98.4 °F (36.9 °C)   TempSrc: Oral   SpO2: 97%   Weight: 157 lb 3.2 oz (71.3 kg)   Height: 5' 3\" (1.6 m)   PainSc:   0 - No pain   u ever feel afraid of your part  Learning Assessment 2020   PRIMARY LEARNER Patient   PRIMARY LANGUAGE ENGLISH   LEARNER PREFERENCE PRIMARY DEMONSTRATION   ANSWERED BY Patient   RELATIONSHIP SELF   ths 3/23/2021   Able to walk?    Cookie Dopp  3 most recent PHQ Screens 3/23/2021   Little interest or pleasure in doing things Not at all   Feeling down, depressed, irritable, or hopeless Not at all   Total Score PHQ 2 0

## 2021-04-01 LAB
ALBUMIN SERPL-MCNC: 4.6 G/DL (ref 3.7–4.7)
ALBUMIN/GLOB SERPL: 2.1 {RATIO} (ref 1.2–2.2)
ALP SERPL-CCNC: 78 IU/L (ref 39–117)
ALT SERPL-CCNC: 17 IU/L (ref 0–32)
AST SERPL-CCNC: 19 IU/L (ref 0–40)
BILIRUB SERPL-MCNC: 0.2 MG/DL (ref 0–1.2)
BUN SERPL-MCNC: 23 MG/DL (ref 8–27)
BUN/CREAT SERPL: 21 (ref 12–28)
CALCIUM SERPL-MCNC: 9.4 MG/DL (ref 8.7–10.3)
CHLORIDE SERPL-SCNC: 101 MMOL/L (ref 96–106)
CHOLEST SERPL-MCNC: 178 MG/DL (ref 100–199)
CO2 SERPL-SCNC: 24 MMOL/L (ref 20–29)
CREAT SERPL-MCNC: 1.07 MG/DL (ref 0.57–1)
GLOBULIN SER CALC-MCNC: 2.2 G/DL (ref 1.5–4.5)
GLUCOSE SERPL-MCNC: 88 MG/DL (ref 65–99)
HDLC SERPL-MCNC: 64 MG/DL
LDLC SERPL CALC-MCNC: 90 MG/DL (ref 0–99)
POTASSIUM SERPL-SCNC: 3.9 MMOL/L (ref 3.5–5.2)
PROT SERPL-MCNC: 6.8 G/DL (ref 6–8.5)
SODIUM SERPL-SCNC: 139 MMOL/L (ref 134–144)
T4 FREE SERPL-MCNC: 1.35 NG/DL (ref 0.82–1.77)
TRIGL SERPL-MCNC: 142 MG/DL (ref 0–149)
TSH SERPL DL<=0.005 MIU/L-ACNC: 2.07 UIU/ML (ref 0.45–4.5)
VLDLC SERPL CALC-MCNC: 24 MG/DL (ref 5–40)

## 2021-04-12 ENCOUNTER — TELEPHONE (OUTPATIENT)
Dept: INTERNAL MEDICINE CLINIC | Age: 71
End: 2021-04-12

## 2021-04-12 NOTE — TELEPHONE ENCOUNTER
Returned phone call to pt, HIPAA verified by two patient identifiers. Advised that labs were normal except slightly dehydrated. Recommended increasing water intake. Pt verified understanding.

## 2021-04-12 NOTE — TELEPHONE ENCOUNTER
----- Message from Carmelo Sees sent at 4/10/2021 10:01 AM EDT -----  Regarding: Dr. Burnard Hodgkin Message/Vendor Calls    Caller's first and last name: Patient      Reason for call: would like a call to discuss her test results      Callback required yes/no and why: yes      Best contact number(s):[phone]      Details to clarify the request:      Carmelo Sees

## 2021-08-24 DIAGNOSIS — I10 ESSENTIAL HYPERTENSION: ICD-10-CM

## 2021-08-24 RX ORDER — LOSARTAN POTASSIUM AND HYDROCHLOROTHIAZIDE 25; 100 MG/1; MG/1
TABLET ORAL
Qty: 90 TABLET | Refills: 0 | Status: SHIPPED | OUTPATIENT
Start: 2021-08-24 | End: 2021-11-19

## 2021-09-03 RX ORDER — BUSPIRONE HYDROCHLORIDE 5 MG/1
10 TABLET ORAL DAILY
Qty: 180 TABLET | Refills: 0 | Status: SHIPPED | OUTPATIENT
Start: 2021-09-03 | End: 2022-02-21

## 2021-09-03 NOTE — TELEPHONE ENCOUNTER
Pt called to request refill of   Requested Prescriptions     Pending Prescriptions Disp Refills    busPIRone (BUSPAR) 5 mg tablet 180 Tablet 0     Sig: Take 2 Tablets by mouth daily.

## 2021-09-03 NOTE — TELEPHONE ENCOUNTER
PCP: Yadi Santos MD     Last appt: 3/23/2021   Future Appointments   Date Time Provider Linda Starr   9/23/2021 10:00 AM Yadi Santos MD [facility] BS AMB        Requested Prescriptions     Pending Prescriptions Disp Refills    busPIRone (BUSPAR) 5 mg tablet 180 Tablet 0     Sig: Take 2 Tablets by mouth daily.

## 2021-11-19 DIAGNOSIS — I10 ESSENTIAL HYPERTENSION: ICD-10-CM

## 2021-11-19 RX ORDER — LOSARTAN POTASSIUM AND HYDROCHLOROTHIAZIDE 25; 100 MG/1; MG/1
TABLET ORAL
Qty: 90 TABLET | Refills: 0 | Status: SHIPPED | OUTPATIENT
Start: 2021-11-19 | End: 2022-02-13

## 2022-02-12 DIAGNOSIS — I10 ESSENTIAL HYPERTENSION: ICD-10-CM

## 2022-02-13 RX ORDER — LOSARTAN POTASSIUM AND HYDROCHLOROTHIAZIDE 25; 100 MG/1; MG/1
TABLET ORAL
Qty: 90 TABLET | Refills: 0 | Status: SHIPPED | OUTPATIENT
Start: 2022-02-13 | End: 2022-06-09

## 2022-03-18 PROBLEM — N95.1 MENOPAUSAL SYNDROME (HOT FLASHES): Status: ACTIVE | Noted: 2020-05-13

## 2022-03-18 PROBLEM — E78.2 MIXED HYPERLIPIDEMIA: Status: ACTIVE | Noted: 2020-05-13

## 2022-03-20 PROBLEM — F41.9 ANXIETY: Status: ACTIVE | Noted: 2020-05-13

## 2022-06-09 DIAGNOSIS — E78.2 MIXED HYPERLIPIDEMIA: ICD-10-CM

## 2022-06-09 DIAGNOSIS — I10 ESSENTIAL HYPERTENSION: ICD-10-CM

## 2022-06-09 RX ORDER — LOSARTAN POTASSIUM AND HYDROCHLOROTHIAZIDE 25; 100 MG/1; MG/1
TABLET ORAL
Qty: 90 TABLET | Refills: 0 | Status: SHIPPED | OUTPATIENT
Start: 2022-06-09

## 2022-06-09 RX ORDER — ROSUVASTATIN CALCIUM 5 MG/1
TABLET, COATED ORAL
Qty: 90 TABLET | Refills: 0 | Status: SHIPPED | OUTPATIENT
Start: 2022-06-09

## 2023-09-28 NOTE — PROGRESS NOTES
Mahesh Castelan is a 79 y.o. female who was seen by synchronous (real-time) audio-video technology on 5/13/2020. Consent: Mahesh Castelan, who was seen by synchronous (real-time) audio-video technology, and/or her healthcare decision maker, is aware that this patient-initiated, Telehealth encounter on 5/13/2020 is a billable service, with coverage as determined by her insurance carrier. She is aware that she may receive a bill and has provided verbal consent to proceed: Yes. This visit was completed virtually using [email or web address] me    Assessment & Plan:   Diagnoses and all orders for this visit:    1. Menopausal syndrome (hot flashes)  -     PARoxetine (PAXIL) 10 mg tablet; Take 0.5 Tabs by mouth daily. Some improvement - can increase to 10mg daily  2. Anxiety  -     PARoxetine (PAXIL) 10 mg tablet; Take 0.5 Tabs by mouth daily. May take buspar 5-10mg tid as needed - hasn't been taking but has prescription from previous provider  3. Mixed hyperlipidemia  -     rosuvastatin (CRESTOR) 5 mg tablet; Take 1 Tab by mouth nightly. Follow-up and Dispositions    · Return in about 3 months (around 8/13/2020) for sleep, hot flashes. Subjective:   Mahesh Castelan is a 79 y.o. female who was seen for Medication Evaluation    Last visit about a month ago started gabapentin but was causing HA, dizziness, fatigue so I recommended stopping it. Has been in [facility] this week. Paroxetine 5mg hs which helps a little with hot flashes, will still have hot flashes during the day. Says anxiety is improved but still some room for improvement. Hasn't been using buspar much lately, helps her sleep better when she takes it at night. Has had some difficulty with sleep the past couple of nights. Quit smoking Easter Sunday, has made anxiety a little worse. Prior to Admission medications    Medication Sig Start Date End Date Taking?  Authorizing Provider   rosuvastatin (CRESTOR) 5 mg tablet Take 1 Tab by mouth nightly. 5/13/20  Yes Steffen Solis MD   PARoxetine (PAXIL) 10 mg tablet Take 0.5 Tabs by mouth daily. 5/13/20  Yes Steffen Solis MD   cholecalciferol (VITAMIN D3) (2,000 UNITS /50 MCG) cap capsule Take  by mouth two (2) times a day. Yes Provider, Historical   busPIRone (BUSPAR) 5 mg tablet Take  by mouth. Yes Provider, Historical   losartan (COZAAR) 100 mg tablet TAKE 1 TABLET BY MOUTH ONCE DAILY FOR 90 DAYS 12/18/19  Yes Provider, Historical   hydroCHLOROthiazide (HYDRODIURIL) 25 mg tablet Take 25 mg by mouth daily. Yes Provider, Historical   multivitamin (ONE A DAY) tablet Take 1 Tab by mouth daily. Yes Provider, Historical   docosahexanoic acid/epa (FISH OIL PO) Take  by mouth. Yes Provider, Historical   B.infantis-B.ani-B.long-B.bifi (PROBIOTIC 4X) 10-15 mg TbEC Take  by mouth. Yes Provider, Historical   vitamin E acetate (VITAMIN E PO) Take  by mouth. Yes Provider, Historical   cinnamon bark (CINNAMON) 500 mg cap Take  by mouth. Yes Provider, Historical   cyanocobalamin 1,000 mcg tablet Take 1,000 mcg by mouth daily. Yes Provider, Historical   ubidecarenone (CO Q-10 PO) Take  by mouth. Yes Provider, Historical   aspirin delayed-release 81 mg tablet Take  by mouth daily. Yes Provider, Historical   PARoxetine (PAXIL) 10 mg tablet TAKE 1 2 (ONE HALF) TABLET BY MOUTH NIGHTLY 4/17/20 5/13/20  Provider, Historical   rosuvastatin (CRESTOR) 5 mg tablet Take  by mouth nightly. 5/13/20  Provider, Historical   gabapentin (NEURONTIN) 100 mg capsule 100mg hs for 3 nights then 200mg hs x 3 nights then 300mg hs 4/13/20   Steffen Solis MD   ondansetron (ZOFRAN ODT) 8 mg disintegrating tablet Take 1 Tab by mouth every eight (8) hours as needed for Nausea, Vomiting or Nausea or Vomiting. 1/28/20   Steffen Solis MD   LORazepam (ATIVAN) 0.5 mg tablet Take 1 tablet by mouth every eight (8) hours as needed for Anxiety.  11/23/14   Tino Katz MD     Allergies   Allergen Reactions  Duloxetine Nausea Only    Flagyl [Metronidazole] Hives    Levaquin [Levofloxacin] Hives    Penicillins Nausea Only           ROS  Per HPI    Objective:   Vital Signs: (As obtained by patient/caregiver at home)  Visit Vitals  Ht 5' 3\" (1.6 m)   Wt 145 lb (65.8 kg)   BMI 25.69 kg/m²        [INSTRUCTIONS:  \"[x]\" Indicates a positive item  \"[]\" Indicates a negative item  -- DELETE ALL ITEMS NOT EXAMINED]    Constitutional: [x] Appears well-developed and well-nourished [x] No apparent distress      [] Abnormal -     Mental status: [x] Alert and awake  [x] Oriented to person/place/time [x] Able to follow commands    [] Abnormal -     Eyes:   EOM    [x]  Normal    [] Abnormal -   Sclera  [x]  Normal    [] Abnormal -          Discharge [x]  None visible   [] Abnormal -     HENT: [x] Normocephalic, atraumatic  [] Abnormal -   [x] Mouth/Throat: Mucous membranes are moist    External Ears [x] Normal  [] Abnormal -    Neck: [x] No visualized mass [] Abnormal -     Pulmonary/Chest: [x] Respiratory effort normal   [x] No visualized signs of difficulty breathing or respiratory distress        [] Abnormal -      Musculoskeletal:   [] Normal gait with no signs of ataxia         [x] Normal range of motion of neck        [] Abnormal -     Neurological:        [x] No Facial Asymmetry (Cranial nerve 7 motor function) (limited exam due to video visit)          [x] No gaze palsy        [] Abnormal -          Skin:        [x] No significant exanthematous lesions or discoloration noted on facial skin         [] Abnormal -            Psychiatric:       [x] Normal Affect [] Abnormal -        [x] No Hallucinations    Other pertinent observable physical exam findings:-        We discussed the expected course, resolution and complications of the diagnosis(es) in detail. Medication risks, benefits, costs, interactions, and alternatives were discussed as indicated.   I advised her to contact the office if her condition worsens, changes or fails to improve as anticipated. She expressed understanding with the diagnosis(es) and plan. Madiha Chandra is a 79 y.o. female who was evaluated by a video visit encounter for concerns as above. Patient identification was verified prior to start of the visit. A caregiver was present when appropriate. Due to this being a TeleHealth encounter ([facility]), evaluation of the following organ systems was limited: Vitals/Constitutional/EENT/Resp/CV/GI//MS/Neuro/Skin/Heme-Lymph-Imm. Pursuant to the emergency declaration under the [address] [place] [address], [facility] waiver authority and the "[employer] and [employer] Act, this Virtual  Visit was conducted, with patient's (and/or legal guardian's) consent, to reduce the patient's risk of exposure to COVID-19 and provide necessary medical care. Services were provided through a video synchronous discussion virtually to substitute for in-person clinic visit. Patient and provider were located at their individual homes.       Kami Marcelino MD JO-ANN 01-Sep-2023 12:38